# Patient Record
Sex: FEMALE | Race: OTHER | NOT HISPANIC OR LATINO | URBAN - METROPOLITAN AREA
[De-identification: names, ages, dates, MRNs, and addresses within clinical notes are randomized per-mention and may not be internally consistent; named-entity substitution may affect disease eponyms.]

---

## 2017-10-03 ENCOUNTER — OUTPATIENT (OUTPATIENT)
Dept: OUTPATIENT SERVICES | Facility: HOSPITAL | Age: 62
LOS: 1 days | End: 2017-10-03
Payer: COMMERCIAL

## 2017-10-03 VITALS
SYSTOLIC BLOOD PRESSURE: 146 MMHG | RESPIRATION RATE: 16 BRPM | HEIGHT: 66 IN | WEIGHT: 199.08 LBS | TEMPERATURE: 98 F | HEART RATE: 63 BPM | DIASTOLIC BLOOD PRESSURE: 78 MMHG

## 2017-10-03 DIAGNOSIS — M17.12 UNILATERAL PRIMARY OSTEOARTHRITIS, LEFT KNEE: ICD-10-CM

## 2017-10-03 DIAGNOSIS — Z01.818 ENCOUNTER FOR OTHER PREPROCEDURAL EXAMINATION: ICD-10-CM

## 2017-10-03 DIAGNOSIS — Z90.710 ACQUIRED ABSENCE OF BOTH CERVIX AND UTERUS: Chronic | ICD-10-CM

## 2017-10-03 LAB
ALBUMIN SERPL ELPH-MCNC: 4.2 G/DL — SIGNIFICANT CHANGE UP (ref 3.3–5)
ALP SERPL-CCNC: 65 U/L — SIGNIFICANT CHANGE UP (ref 40–120)
ALT FLD-CCNC: 26 U/L — SIGNIFICANT CHANGE UP (ref 12–78)
ANION GAP SERPL CALC-SCNC: 7 MMOL/L — SIGNIFICANT CHANGE UP (ref 5–17)
APTT BLD: 31.8 SEC — SIGNIFICANT CHANGE UP (ref 27.5–37.4)
AST SERPL-CCNC: 17 U/L — SIGNIFICANT CHANGE UP (ref 15–37)
BILIRUB SERPL-MCNC: 0.5 MG/DL — SIGNIFICANT CHANGE UP (ref 0.2–1.2)
BUN SERPL-MCNC: 20 MG/DL — SIGNIFICANT CHANGE UP (ref 7–23)
CALCIUM SERPL-MCNC: 9.1 MG/DL — SIGNIFICANT CHANGE UP (ref 8.5–10.1)
CHLORIDE SERPL-SCNC: 104 MMOL/L — SIGNIFICANT CHANGE UP (ref 96–108)
CO2 SERPL-SCNC: 29 MMOL/L — SIGNIFICANT CHANGE UP (ref 22–31)
CREAT SERPL-MCNC: 0.8 MG/DL — SIGNIFICANT CHANGE UP (ref 0.5–1.3)
GLUCOSE SERPL-MCNC: 94 MG/DL — SIGNIFICANT CHANGE UP (ref 70–99)
HCT VFR BLD CALC: 40.8 % — SIGNIFICANT CHANGE UP (ref 34.5–45)
HGB BLD-MCNC: 13.6 G/DL — SIGNIFICANT CHANGE UP (ref 11.5–15.5)
INR BLD: 1.05 RATIO — SIGNIFICANT CHANGE UP (ref 0.88–1.16)
MCHC RBC-ENTMCNC: 28.3 PG — SIGNIFICANT CHANGE UP (ref 27–34)
MCHC RBC-ENTMCNC: 33.4 GM/DL — SIGNIFICANT CHANGE UP (ref 32–36)
MCV RBC AUTO: 84.8 FL — SIGNIFICANT CHANGE UP (ref 80–100)
PLATELET # BLD AUTO: 266 K/UL — SIGNIFICANT CHANGE UP (ref 150–400)
POTASSIUM SERPL-MCNC: 4.1 MMOL/L — SIGNIFICANT CHANGE UP (ref 3.5–5.3)
POTASSIUM SERPL-SCNC: 4.1 MMOL/L — SIGNIFICANT CHANGE UP (ref 3.5–5.3)
PROT SERPL-MCNC: 7.4 G/DL — SIGNIFICANT CHANGE UP (ref 6–8.3)
PROTHROM AB SERPL-ACNC: 11.5 SEC — SIGNIFICANT CHANGE UP (ref 9.8–12.7)
RBC # BLD: 4.81 M/UL — SIGNIFICANT CHANGE UP (ref 3.8–5.2)
RBC # FLD: 12.8 % — SIGNIFICANT CHANGE UP (ref 10.3–14.5)
SODIUM SERPL-SCNC: 140 MMOL/L — SIGNIFICANT CHANGE UP (ref 135–145)
WBC # BLD: 10.3 K/UL — SIGNIFICANT CHANGE UP (ref 3.8–10.5)
WBC # FLD AUTO: 10.3 K/UL — SIGNIFICANT CHANGE UP (ref 3.8–10.5)

## 2017-10-03 PROCEDURE — 73562 X-RAY EXAM OF KNEE 3: CPT | Mod: 26,LT

## 2017-10-03 PROCEDURE — 93010 ELECTROCARDIOGRAM REPORT: CPT | Mod: NC

## 2017-10-03 PROCEDURE — 71020: CPT | Mod: 26

## 2017-10-03 NOTE — H&P PST ADULT - HISTORY OF PRESENT ILLNESS
63 yo female scheduled for Left Total Knee Replacement on 10/18/17 with Dr Sanders.  Patient has pain in both knees and plans to have both knees replaced Pain worse with movement   Pain for 5 years 63 yo female scheduled for Left Total Knee Replacement on 10/18/17 with Dr Sanders.  Patient plans to have both knees replaced. Patient  states she  has had  pain in both knees for the past 5 years. The knee pain is worse with movement

## 2017-10-03 NOTE — H&P PST ADULT - NSANTHOSAYNRD_GEN_A_CORE
No. RIKA screening performed.  STOP BANG Legend: 0-2 = LOW Risk; 3-4 = INTERMEDIATE Risk; 5-8 = HIGH Risk

## 2017-10-03 NOTE — H&P PST ADULT - PROBLEM SELECTOR PLAN 1
63 yo female scheduled for Left Total Knee Replacement on 10/18/17 with Dr Sanders.   Check labs CBC CMP PT PTT Type and Screen MRSA  EKG  X-ray Chest and Left Knee Xray  Hibiclens Scrubs for 3 days with written and verbal instructions given to patient  Patient to stop Naprosyn and Celebrex 10/11/17  No medications the morning of surgery  Preop instructions were given  Patient verbalizes understanding of instructions   MRSA Instructions written and verbal for positive MRSA given  Patient and her daughter are attending the Joint Replacement Class today  Patient and her daughter verbalize understanding of instructions

## 2017-10-03 NOTE — H&P PST ADULT - FAMILY HISTORY
Mother  Still living? Yes, Estimated age: 87  Hypertension, Age at diagnosis: Age Unknown     Father  Still living? Yes, Estimated age: 90  Family history of cataracts, Age at diagnosis: Age Unknown

## 2017-10-04 LAB
MRSA PCR RESULT.: SIGNIFICANT CHANGE UP
S AUREUS DNA NOSE QL NAA+PROBE: SIGNIFICANT CHANGE UP

## 2017-10-13 RX ORDER — SODIUM CHLORIDE 9 MG/ML
3 INJECTION INTRAMUSCULAR; INTRAVENOUS; SUBCUTANEOUS EVERY 8 HOURS
Qty: 0 | Refills: 0 | Status: DISCONTINUED | OUTPATIENT
Start: 2017-10-16 | End: 2017-10-26

## 2017-10-13 RX ORDER — SODIUM CHLORIDE 9 MG/ML
1000 INJECTION, SOLUTION INTRAVENOUS
Qty: 0 | Refills: 0 | Status: DISCONTINUED | OUTPATIENT
Start: 2017-10-16 | End: 2017-10-16

## 2017-10-15 PROCEDURE — 73562 X-RAY EXAM OF KNEE 3: CPT

## 2017-10-15 PROCEDURE — 87641 MR-STAPH DNA AMP PROBE: CPT

## 2017-10-15 PROCEDURE — 86850 RBC ANTIBODY SCREEN: CPT

## 2017-10-15 PROCEDURE — G0463: CPT

## 2017-10-15 PROCEDURE — 71046 X-RAY EXAM CHEST 2 VIEWS: CPT

## 2017-10-15 PROCEDURE — 86900 BLOOD TYPING SEROLOGIC ABO: CPT

## 2017-10-15 PROCEDURE — 86901 BLOOD TYPING SEROLOGIC RH(D): CPT

## 2017-10-15 PROCEDURE — 85610 PROTHROMBIN TIME: CPT

## 2017-10-15 PROCEDURE — 86920 COMPATIBILITY TEST SPIN: CPT

## 2017-10-15 PROCEDURE — 93005 ELECTROCARDIOGRAM TRACING: CPT

## 2017-10-15 PROCEDURE — 80053 COMPREHEN METABOLIC PANEL: CPT

## 2017-10-15 PROCEDURE — 85027 COMPLETE CBC AUTOMATED: CPT

## 2017-10-15 PROCEDURE — 87640 STAPH A DNA AMP PROBE: CPT

## 2017-10-15 PROCEDURE — 85730 THROMBOPLASTIN TIME PARTIAL: CPT

## 2017-10-16 ENCOUNTER — INPATIENT (INPATIENT)
Facility: HOSPITAL | Age: 62
LOS: 9 days | Discharge: NURSING, REHAB OR LONG-TERM CARE | DRG: 462 | End: 2017-10-26
Attending: ORTHOPAEDIC SURGERY | Admitting: ORTHOPAEDIC SURGERY
Payer: COMMERCIAL

## 2017-10-16 VITALS
HEIGHT: 66 IN | DIASTOLIC BLOOD PRESSURE: 83 MMHG | SYSTOLIC BLOOD PRESSURE: 142 MMHG | WEIGHT: 201.06 LBS | OXYGEN SATURATION: 96 % | TEMPERATURE: 98 F | HEART RATE: 69 BPM | RESPIRATION RATE: 16 BRPM

## 2017-10-16 DIAGNOSIS — M17.12 UNILATERAL PRIMARY OSTEOARTHRITIS, LEFT KNEE: ICD-10-CM

## 2017-10-16 DIAGNOSIS — Z90.710 ACQUIRED ABSENCE OF BOTH CERVIX AND UTERUS: Chronic | ICD-10-CM

## 2017-10-16 LAB
ABO RH CONFIRMATION: SIGNIFICANT CHANGE UP
HCT VFR BLD CALC: 39.5 % — SIGNIFICANT CHANGE UP (ref 34.5–45)
HGB BLD-MCNC: 12.7 G/DL — SIGNIFICANT CHANGE UP (ref 11.5–15.5)

## 2017-10-16 PROCEDURE — 73562 X-RAY EXAM OF KNEE 3: CPT | Mod: 26,LT

## 2017-10-16 PROCEDURE — 88305 TISSUE EXAM BY PATHOLOGIST: CPT | Mod: 26

## 2017-10-16 PROCEDURE — 88311 DECALCIFY TISSUE: CPT | Mod: 26

## 2017-10-16 RX ORDER — FOLIC ACID 0.8 MG
1 TABLET ORAL DAILY
Qty: 0 | Refills: 0 | Status: DISCONTINUED | OUTPATIENT
Start: 2017-10-16 | End: 2017-10-26

## 2017-10-16 RX ORDER — ACETAMINOPHEN 500 MG
1000 TABLET ORAL ONCE
Qty: 0 | Refills: 0 | Status: COMPLETED | OUTPATIENT
Start: 2017-10-17 | End: 2017-10-17

## 2017-10-16 RX ORDER — DOCUSATE SODIUM 100 MG
100 CAPSULE ORAL THREE TIMES A DAY
Qty: 0 | Refills: 0 | Status: DISCONTINUED | OUTPATIENT
Start: 2017-10-16 | End: 2017-10-26

## 2017-10-16 RX ORDER — MORPHINE SULFATE 50 MG/1
2 CAPSULE, EXTENDED RELEASE ORAL EVERY 4 HOURS
Qty: 0 | Refills: 0 | Status: DISCONTINUED | OUTPATIENT
Start: 2017-10-16 | End: 2017-10-23

## 2017-10-16 RX ORDER — SODIUM CHLORIDE 9 MG/ML
1000 INJECTION, SOLUTION INTRAVENOUS
Qty: 0 | Refills: 0 | Status: DISCONTINUED | OUTPATIENT
Start: 2017-10-16 | End: 2017-10-16

## 2017-10-16 RX ORDER — CELECOXIB 200 MG/1
1 CAPSULE ORAL
Qty: 0 | Refills: 0 | COMMUNITY

## 2017-10-16 RX ORDER — BUPIVACAINE 13.3 MG/ML
20 INJECTION, SUSPENSION, LIPOSOMAL INFILTRATION ONCE
Qty: 0 | Refills: 0 | Status: DISCONTINUED | OUTPATIENT
Start: 2017-10-16 | End: 2017-10-16

## 2017-10-16 RX ORDER — HYDROMORPHONE HYDROCHLORIDE 2 MG/ML
2 INJECTION INTRAMUSCULAR; INTRAVENOUS; SUBCUTANEOUS EVERY 4 HOURS
Qty: 0 | Refills: 0 | Status: DISCONTINUED | OUTPATIENT
Start: 2017-10-16 | End: 2017-10-23

## 2017-10-16 RX ORDER — ENOXAPARIN SODIUM 100 MG/ML
40 INJECTION SUBCUTANEOUS DAILY
Qty: 0 | Refills: 0 | Status: COMPLETED | OUTPATIENT
Start: 2017-10-17 | End: 2017-10-22

## 2017-10-16 RX ORDER — OXYCODONE HYDROCHLORIDE 5 MG/1
5 TABLET ORAL EVERY 4 HOURS
Qty: 0 | Refills: 0 | Status: DISCONTINUED | OUTPATIENT
Start: 2017-10-16 | End: 2017-10-16

## 2017-10-16 RX ORDER — OXYCODONE HYDROCHLORIDE 5 MG/1
10 TABLET ORAL EVERY 6 HOURS
Qty: 0 | Refills: 0 | Status: DISCONTINUED | OUTPATIENT
Start: 2017-10-16 | End: 2017-10-16

## 2017-10-16 RX ORDER — PANTOPRAZOLE SODIUM 20 MG/1
40 TABLET, DELAYED RELEASE ORAL DAILY
Qty: 0 | Refills: 0 | Status: DISCONTINUED | OUTPATIENT
Start: 2017-10-16 | End: 2017-10-26

## 2017-10-16 RX ORDER — FERROUS SULFATE 325(65) MG
325 TABLET ORAL
Qty: 0 | Refills: 0 | Status: DISCONTINUED | OUTPATIENT
Start: 2017-10-16 | End: 2017-10-26

## 2017-10-16 RX ORDER — CEFAZOLIN SODIUM 1 G
2000 VIAL (EA) INJECTION EVERY 8 HOURS
Qty: 0 | Refills: 0 | Status: COMPLETED | OUTPATIENT
Start: 2017-10-16 | End: 2017-10-17

## 2017-10-16 RX ORDER — ONDANSETRON 8 MG/1
4 TABLET, FILM COATED ORAL EVERY 6 HOURS
Qty: 0 | Refills: 0 | Status: DISCONTINUED | OUTPATIENT
Start: 2017-10-16 | End: 2017-10-26

## 2017-10-16 RX ORDER — HYDROMORPHONE HYDROCHLORIDE 2 MG/ML
0.5 INJECTION INTRAMUSCULAR; INTRAVENOUS; SUBCUTANEOUS
Qty: 0 | Refills: 0 | Status: DISCONTINUED | OUTPATIENT
Start: 2017-10-16 | End: 2017-10-16

## 2017-10-16 RX ORDER — HYDROMORPHONE HYDROCHLORIDE 2 MG/ML
4 INJECTION INTRAMUSCULAR; INTRAVENOUS; SUBCUTANEOUS EVERY 4 HOURS
Qty: 0 | Refills: 0 | Status: DISCONTINUED | OUTPATIENT
Start: 2017-10-16 | End: 2017-10-23

## 2017-10-16 RX ORDER — ACETAMINOPHEN 500 MG
1000 TABLET ORAL ONCE
Qty: 0 | Refills: 0 | Status: COMPLETED | OUTPATIENT
Start: 2017-10-16 | End: 2017-10-16

## 2017-10-16 RX ORDER — CHOLECALCIFEROL (VITAMIN D3) 125 MCG
0 CAPSULE ORAL
Qty: 0 | Refills: 0 | COMMUNITY

## 2017-10-16 RX ORDER — ASCORBIC ACID 60 MG
500 TABLET,CHEWABLE ORAL
Qty: 0 | Refills: 0 | Status: DISCONTINUED | OUTPATIENT
Start: 2017-10-16 | End: 2017-10-26

## 2017-10-16 RX ORDER — SODIUM CHLORIDE 9 MG/ML
1000 INJECTION, SOLUTION INTRAVENOUS
Qty: 0 | Refills: 0 | Status: DISCONTINUED | OUTPATIENT
Start: 2017-10-16 | End: 2017-10-17

## 2017-10-16 RX ORDER — CELECOXIB 200 MG/1
200 CAPSULE ORAL
Qty: 0 | Refills: 0 | Status: DISCONTINUED | OUTPATIENT
Start: 2017-10-16 | End: 2017-10-26

## 2017-10-16 RX ORDER — PREGABALIN 225 MG/1
0 CAPSULE ORAL
Qty: 0 | Refills: 0 | COMMUNITY

## 2017-10-16 RX ORDER — CEFAZOLIN SODIUM 1 G
2000 VIAL (EA) INJECTION ONCE
Qty: 0 | Refills: 0 | Status: COMPLETED | OUTPATIENT
Start: 2017-10-16 | End: 2017-10-16

## 2017-10-16 RX ORDER — ONDANSETRON 8 MG/1
4 TABLET, FILM COATED ORAL ONCE
Qty: 0 | Refills: 0 | Status: DISCONTINUED | OUTPATIENT
Start: 2017-10-16 | End: 2017-10-16

## 2017-10-16 RX ADMIN — HYDROMORPHONE HYDROCHLORIDE 2 MILLIGRAM(S): 2 INJECTION INTRAMUSCULAR; INTRAVENOUS; SUBCUTANEOUS at 22:17

## 2017-10-16 RX ADMIN — Medication 1000 MILLIGRAM(S): at 18:25

## 2017-10-16 RX ADMIN — Medication 400 MILLIGRAM(S): at 18:20

## 2017-10-16 RX ADMIN — SODIUM CHLORIDE 30 MILLILITER(S): 9 INJECTION, SOLUTION INTRAVENOUS at 09:10

## 2017-10-16 RX ADMIN — Medication 100 MILLIGRAM(S): at 19:10

## 2017-10-16 RX ADMIN — HYDROMORPHONE HYDROCHLORIDE 2 MILLIGRAM(S): 2 INJECTION INTRAMUSCULAR; INTRAVENOUS; SUBCUTANEOUS at 23:00

## 2017-10-16 RX ADMIN — Medication 325 MILLIGRAM(S): at 18:20

## 2017-10-16 RX ADMIN — CELECOXIB 200 MILLIGRAM(S): 200 CAPSULE ORAL at 18:20

## 2017-10-16 RX ADMIN — CELECOXIB 200 MILLIGRAM(S): 200 CAPSULE ORAL at 18:25

## 2017-10-16 RX ADMIN — SODIUM CHLORIDE 100 MILLILITER(S): 9 INJECTION, SOLUTION INTRAVENOUS at 14:30

## 2017-10-16 RX ADMIN — SODIUM CHLORIDE 75 MILLILITER(S): 9 INJECTION, SOLUTION INTRAVENOUS at 22:22

## 2017-10-16 RX ADMIN — Medication 500 MILLIGRAM(S): at 18:20

## 2017-10-16 RX ADMIN — SODIUM CHLORIDE 3 MILLILITER(S): 9 INJECTION INTRAMUSCULAR; INTRAVENOUS; SUBCUTANEOUS at 21:19

## 2017-10-16 RX ADMIN — Medication 100 MILLIGRAM(S): at 22:17

## 2017-10-16 NOTE — CONSULT NOTE ADULT - ASSESSMENT
62 female adm for staged b/l knee replacements  dvt proph- lovenox 40  pain control, ambulation  will follow

## 2017-10-16 NOTE — CONSULT NOTE ADULT - SUBJECTIVE AND OBJECTIVE BOX
Patient is a 62y old  Female who presents with a chief complaint of Left Knee replacement (16 Oct 2017 09:03)  feels well, no complaints      INTERVAL HPI/OVERNIGHT EVENTS:  T(C): 36.4 (10-16-17 @ 16:06), Max: 36.8 (10-16-17 @ 08:58)  HR: 70 (10-16-17 @ 16:06) (60 - 70)  BP: 124/77 (10-16-17 @ 16:06) (124/77 - 142/83)  RR: 16 (10-16-17 @ 16:06) (15 - 19)  SpO2: 98% (10-16-17 @ 16:06) (96% - 100%)  Wt(kg): --  I&O's Summary    16 Oct 2017 07:01  -  16 Oct 2017 19:01  --------------------------------------------------------  IN: 350 mL / OUT: 0 mL / NET: 350 mL        PAST MEDICAL & SURGICAL HISTORY:  Unilateral primary osteoarthritis, left knee  H/O abdominal hysterectomy: 2002      SOCIAL HISTORY  Alcohol: none  Tobacco: none  Illicit substance use: none      FAMILY HISTORY: non contrib    Home Medications:  CeleBREX 200 mg oral capsule: 1 cap(s) orally 2 times a day (16 Oct 2017 08:58)  Vitamin B12:  (16 Oct 2017 08:58)  Vitamin D3: orally once a day (16 Oct 2017 08:58)        LABS:                        12.7   x     )-----------( x        ( 16 Oct 2017 14:57 )             39.5                 MEDICATIONS  (STANDING):  ascorbic acid 500 milliGRAM(s) Oral two times a day  ceFAZolin   IVPB 2000 milliGRAM(s) IV Intermittent every 8 hours  celecoxib 200 milliGRAM(s) Oral two times a day after meals  docusate sodium 100 milliGRAM(s) Oral three times a day  ferrous    sulfate 325 milliGRAM(s) Oral three times a day with meals  folic acid 1 milliGRAM(s) Oral daily  lactated ringers. 1000 milliLiter(s) (75 mL/Hr) IV Continuous <Continuous>  multivitamin 1 Tablet(s) Oral daily  pantoprazole    Tablet 40 milliGRAM(s) Oral daily  sodium chloride 0.9% lock flush 3 milliLiter(s) IV Push every 8 hours    MEDICATIONS  (PRN):  HYDROmorphone   Tablet 2 milliGRAM(s) Oral every 4 hours PRN Moderate Pain (4 - 6)  HYDROmorphone   Tablet 4 milliGRAM(s) Oral every 4 hours PRN Severe Pain (7 - 10)  morphine  - Injectable 2 milliGRAM(s) IV Push every 4 hours PRN Severe Pain (7 - 10)  ondansetron Injectable 4 milliGRAM(s) IV Push every 6 hours PRN Nausea and/or Vomiting      REVIEW OF SYSTEMS:  CONSTITUTIONAL: No fever, weight loss, or fatigue  EYES: No eye pain, visual disturbances, or discharge  ENMT:  No difficulty hearing, tinnitus, vertigo; No sinus or throat pain  NECK: No pain or stiffness  RESPIRATORY: No cough, wheezing, chills or hemoptysis; No shortness of breath  CARDIOVASCULAR: No chest pain, palpitations, dizziness, or leg swelling  GASTROINTESTINAL: No abdominal or epigastric pain. No nausea, vomiting, or hematemesis; No diarrhea or constipation. No melena or hematochezia.  GENITOURINARY: No dysuria, frequency, hematuria, or incontinence  NEUROLOGICAL: No headaches, memory loss, loss of strength, numbness, or tremors  SKIN: No itching, burning, rashes, or lesions   LYMPH NODES: No enlarged glands  ENDOCRINE: No heat or cold intolerance; No hair loss  MUSCULOSKELETAL: pt has chronic knee pain  PSYCHIATRIC: No depression, anxiety, mood swings, or difficulty sleeping  HEME/LYMPH: No easy bruising, or bleeding gums  ALLERY AND IMMUNOLOGIC: No hives or eczema    RADIOLOGY & ADDITIONAL TESTS:    Imaging Personally Reviewed:  [x ] YES  [ ] NO    Consultant(s) Notes Reviewed:  [ x] YES  [ ] NO        PHYSICAL EXAM:  GENERAL: NAD, well-groomed, well-developed  HEAD:  Atraumatic, Normocephalic  EYES: EOMI, PERRLA, conjunctiva and sclera clear  ENMT: No tonsillar erythema, exudates, or enlargement; Moist mucous membranes, Good dentition, No lesions  NECK: Supple, No JVD, Normal thyroid  NERVOUS SYSTEM:  Alert & Oriented X3, Good concentration; Motor Strength 5/5 B/L upper and lower extremities; DTRs 2+ intact and symmetric  CHEST/LUNG: Clear to percussion bilaterally; No rales, rhonchi, wheezing, or rubs  HEART: Regular rate and rhythm; No murmurs, rubs, or gallops  ABDOMEN: Soft, Nontender, Nondistended; Bowel sounds present  EXTREMITIES:  2+ Peripheral Pulses, No clubbing, cyanosis, or edema  LYMPH: No lymphadenopathy noted  SKIN: No rashes or lesions    Care Discussed with Consultants/Other Providers [ ] YES  [ ] NO

## 2017-10-17 LAB
ANION GAP SERPL CALC-SCNC: 9 MMOL/L — SIGNIFICANT CHANGE UP (ref 5–17)
BUN SERPL-MCNC: 15 MG/DL — SIGNIFICANT CHANGE UP (ref 7–23)
CALCIUM SERPL-MCNC: 8.4 MG/DL — LOW (ref 8.5–10.1)
CHLORIDE SERPL-SCNC: 105 MMOL/L — SIGNIFICANT CHANGE UP (ref 96–108)
CO2 SERPL-SCNC: 25 MMOL/L — SIGNIFICANT CHANGE UP (ref 22–31)
CREAT SERPL-MCNC: 0.82 MG/DL — SIGNIFICANT CHANGE UP (ref 0.5–1.3)
GLUCOSE SERPL-MCNC: 117 MG/DL — HIGH (ref 70–99)
HCT VFR BLD CALC: 34.2 % — LOW (ref 34.5–45)
HGB BLD-MCNC: 11.1 G/DL — LOW (ref 11.5–15.5)
POTASSIUM SERPL-MCNC: 4.2 MMOL/L — SIGNIFICANT CHANGE UP (ref 3.5–5.3)
POTASSIUM SERPL-SCNC: 4.2 MMOL/L — SIGNIFICANT CHANGE UP (ref 3.5–5.3)
SODIUM SERPL-SCNC: 139 MMOL/L — SIGNIFICANT CHANGE UP (ref 135–145)

## 2017-10-17 RX ADMIN — Medication 1000 MILLIGRAM(S): at 06:20

## 2017-10-17 RX ADMIN — Medication 100 MILLIGRAM(S): at 22:47

## 2017-10-17 RX ADMIN — Medication 100 MILLIGRAM(S): at 05:53

## 2017-10-17 RX ADMIN — Medication 1 TABLET(S): at 12:20

## 2017-10-17 RX ADMIN — SODIUM CHLORIDE 3 MILLILITER(S): 9 INJECTION INTRAMUSCULAR; INTRAVENOUS; SUBCUTANEOUS at 22:48

## 2017-10-17 RX ADMIN — CELECOXIB 200 MILLIGRAM(S): 200 CAPSULE ORAL at 18:04

## 2017-10-17 RX ADMIN — Medication 325 MILLIGRAM(S): at 08:23

## 2017-10-17 RX ADMIN — Medication 325 MILLIGRAM(S): at 12:20

## 2017-10-17 RX ADMIN — HYDROMORPHONE HYDROCHLORIDE 2 MILLIGRAM(S): 2 INJECTION INTRAMUSCULAR; INTRAVENOUS; SUBCUTANEOUS at 22:55

## 2017-10-17 RX ADMIN — Medication 100 MILLIGRAM(S): at 15:18

## 2017-10-17 RX ADMIN — SODIUM CHLORIDE 3 MILLILITER(S): 9 INJECTION INTRAMUSCULAR; INTRAVENOUS; SUBCUTANEOUS at 15:17

## 2017-10-17 RX ADMIN — Medication 1000 MILLIGRAM(S): at 02:12

## 2017-10-17 RX ADMIN — Medication 500 MILLIGRAM(S): at 05:53

## 2017-10-17 RX ADMIN — SODIUM CHLORIDE 3 MILLILITER(S): 9 INJECTION INTRAMUSCULAR; INTRAVENOUS; SUBCUTANEOUS at 05:45

## 2017-10-17 RX ADMIN — HYDROMORPHONE HYDROCHLORIDE 2 MILLIGRAM(S): 2 INJECTION INTRAMUSCULAR; INTRAVENOUS; SUBCUTANEOUS at 23:55

## 2017-10-17 RX ADMIN — Medication 400 MILLIGRAM(S): at 01:42

## 2017-10-17 RX ADMIN — CELECOXIB 200 MILLIGRAM(S): 200 CAPSULE ORAL at 08:25

## 2017-10-17 RX ADMIN — Medication 325 MILLIGRAM(S): at 18:01

## 2017-10-17 RX ADMIN — PANTOPRAZOLE SODIUM 40 MILLIGRAM(S): 20 TABLET, DELAYED RELEASE ORAL at 12:20

## 2017-10-17 RX ADMIN — ENOXAPARIN SODIUM 40 MILLIGRAM(S): 100 INJECTION SUBCUTANEOUS at 12:19

## 2017-10-17 RX ADMIN — Medication 400 MILLIGRAM(S): at 05:54

## 2017-10-17 RX ADMIN — CELECOXIB 200 MILLIGRAM(S): 200 CAPSULE ORAL at 08:22

## 2017-10-17 RX ADMIN — CELECOXIB 200 MILLIGRAM(S): 200 CAPSULE ORAL at 18:01

## 2017-10-17 RX ADMIN — Medication 500 MILLIGRAM(S): at 18:02

## 2017-10-17 RX ADMIN — Medication 1 MILLIGRAM(S): at 12:19

## 2017-10-18 LAB
HCT VFR BLD CALC: 30.8 % — LOW (ref 34.5–45)
HGB BLD-MCNC: 10 G/DL — LOW (ref 11.5–15.5)
SURGICAL PATHOLOGY FINAL REPORT - CH: SIGNIFICANT CHANGE UP

## 2017-10-18 RX ADMIN — ENOXAPARIN SODIUM 40 MILLIGRAM(S): 100 INJECTION SUBCUTANEOUS at 12:40

## 2017-10-18 RX ADMIN — Medication 500 MILLIGRAM(S): at 06:28

## 2017-10-18 RX ADMIN — Medication 325 MILLIGRAM(S): at 17:01

## 2017-10-18 RX ADMIN — Medication 1 MILLIGRAM(S): at 12:39

## 2017-10-18 RX ADMIN — HYDROMORPHONE HYDROCHLORIDE 2 MILLIGRAM(S): 2 INJECTION INTRAMUSCULAR; INTRAVENOUS; SUBCUTANEOUS at 05:30

## 2017-10-18 RX ADMIN — SODIUM CHLORIDE 3 MILLILITER(S): 9 INJECTION INTRAMUSCULAR; INTRAVENOUS; SUBCUTANEOUS at 06:26

## 2017-10-18 RX ADMIN — Medication 1 TABLET(S): at 12:39

## 2017-10-18 RX ADMIN — SODIUM CHLORIDE 3 MILLILITER(S): 9 INJECTION INTRAMUSCULAR; INTRAVENOUS; SUBCUTANEOUS at 21:00

## 2017-10-18 RX ADMIN — CELECOXIB 200 MILLIGRAM(S): 200 CAPSULE ORAL at 19:15

## 2017-10-18 RX ADMIN — CELECOXIB 200 MILLIGRAM(S): 200 CAPSULE ORAL at 18:36

## 2017-10-18 RX ADMIN — CELECOXIB 200 MILLIGRAM(S): 200 CAPSULE ORAL at 08:25

## 2017-10-18 RX ADMIN — Medication 500 MILLIGRAM(S): at 18:36

## 2017-10-18 RX ADMIN — CELECOXIB 200 MILLIGRAM(S): 200 CAPSULE ORAL at 09:25

## 2017-10-18 RX ADMIN — Medication 100 MILLIGRAM(S): at 06:28

## 2017-10-18 RX ADMIN — Medication 325 MILLIGRAM(S): at 12:40

## 2017-10-18 RX ADMIN — HYDROMORPHONE HYDROCHLORIDE 2 MILLIGRAM(S): 2 INJECTION INTRAMUSCULAR; INTRAVENOUS; SUBCUTANEOUS at 12:39

## 2017-10-18 RX ADMIN — SODIUM CHLORIDE 3 MILLILITER(S): 9 INJECTION INTRAMUSCULAR; INTRAVENOUS; SUBCUTANEOUS at 16:49

## 2017-10-18 RX ADMIN — HYDROMORPHONE HYDROCHLORIDE 2 MILLIGRAM(S): 2 INJECTION INTRAMUSCULAR; INTRAVENOUS; SUBCUTANEOUS at 04:21

## 2017-10-18 RX ADMIN — HYDROMORPHONE HYDROCHLORIDE 2 MILLIGRAM(S): 2 INJECTION INTRAMUSCULAR; INTRAVENOUS; SUBCUTANEOUS at 13:45

## 2017-10-18 RX ADMIN — PANTOPRAZOLE SODIUM 40 MILLIGRAM(S): 20 TABLET, DELAYED RELEASE ORAL at 12:39

## 2017-10-18 RX ADMIN — Medication 325 MILLIGRAM(S): at 08:25

## 2017-10-19 RX ADMIN — SODIUM CHLORIDE 3 MILLILITER(S): 9 INJECTION INTRAMUSCULAR; INTRAVENOUS; SUBCUTANEOUS at 07:18

## 2017-10-19 RX ADMIN — ENOXAPARIN SODIUM 40 MILLIGRAM(S): 100 INJECTION SUBCUTANEOUS at 11:32

## 2017-10-19 RX ADMIN — Medication 1 TABLET(S): at 11:22

## 2017-10-19 RX ADMIN — SODIUM CHLORIDE 3 MILLILITER(S): 9 INJECTION INTRAMUSCULAR; INTRAVENOUS; SUBCUTANEOUS at 15:06

## 2017-10-19 RX ADMIN — HYDROMORPHONE HYDROCHLORIDE 2 MILLIGRAM(S): 2 INJECTION INTRAMUSCULAR; INTRAVENOUS; SUBCUTANEOUS at 07:20

## 2017-10-19 RX ADMIN — HYDROMORPHONE HYDROCHLORIDE 2 MILLIGRAM(S): 2 INJECTION INTRAMUSCULAR; INTRAVENOUS; SUBCUTANEOUS at 21:00

## 2017-10-19 RX ADMIN — SODIUM CHLORIDE 3 MILLILITER(S): 9 INJECTION INTRAMUSCULAR; INTRAVENOUS; SUBCUTANEOUS at 21:10

## 2017-10-19 RX ADMIN — Medication 1 MILLIGRAM(S): at 11:21

## 2017-10-19 RX ADMIN — PANTOPRAZOLE SODIUM 40 MILLIGRAM(S): 20 TABLET, DELAYED RELEASE ORAL at 11:24

## 2017-10-19 RX ADMIN — Medication 100 MILLIGRAM(S): at 21:09

## 2017-10-19 RX ADMIN — Medication 500 MILLIGRAM(S): at 07:22

## 2017-10-19 RX ADMIN — Medication 325 MILLIGRAM(S): at 11:32

## 2017-10-19 RX ADMIN — HYDROMORPHONE HYDROCHLORIDE 2 MILLIGRAM(S): 2 INJECTION INTRAMUSCULAR; INTRAVENOUS; SUBCUTANEOUS at 21:09

## 2017-10-19 RX ADMIN — Medication 325 MILLIGRAM(S): at 08:35

## 2017-10-19 RX ADMIN — Medication 500 MILLIGRAM(S): at 18:07

## 2017-10-19 RX ADMIN — CELECOXIB 200 MILLIGRAM(S): 200 CAPSULE ORAL at 09:30

## 2017-10-19 RX ADMIN — CELECOXIB 200 MILLIGRAM(S): 200 CAPSULE ORAL at 08:35

## 2017-10-19 RX ADMIN — Medication 325 MILLIGRAM(S): at 18:07

## 2017-10-19 RX ADMIN — HYDROMORPHONE HYDROCHLORIDE 2 MILLIGRAM(S): 2 INJECTION INTRAMUSCULAR; INTRAVENOUS; SUBCUTANEOUS at 08:31

## 2017-10-19 RX ADMIN — CELECOXIB 200 MILLIGRAM(S): 200 CAPSULE ORAL at 18:07

## 2017-10-20 RX ORDER — SODIUM CHLORIDE 9 MG/ML
1000 INJECTION INTRAMUSCULAR; INTRAVENOUS; SUBCUTANEOUS
Qty: 0 | Refills: 0 | Status: DISCONTINUED | OUTPATIENT
Start: 2017-10-22 | End: 2017-10-23

## 2017-10-20 RX ORDER — VANCOMYCIN HCL 1 G
1500 VIAL (EA) INTRAVENOUS ONCE
Qty: 0 | Refills: 0 | Status: COMPLETED | OUTPATIENT
Start: 2017-10-23 | End: 2017-10-23

## 2017-10-20 RX ADMIN — CELECOXIB 200 MILLIGRAM(S): 200 CAPSULE ORAL at 17:15

## 2017-10-20 RX ADMIN — Medication 1 TABLET(S): at 11:03

## 2017-10-20 RX ADMIN — HYDROMORPHONE HYDROCHLORIDE 2 MILLIGRAM(S): 2 INJECTION INTRAMUSCULAR; INTRAVENOUS; SUBCUTANEOUS at 21:42

## 2017-10-20 RX ADMIN — Medication 325 MILLIGRAM(S): at 09:16

## 2017-10-20 RX ADMIN — Medication 100 MILLIGRAM(S): at 05:16

## 2017-10-20 RX ADMIN — SODIUM CHLORIDE 3 MILLILITER(S): 9 INJECTION INTRAMUSCULAR; INTRAVENOUS; SUBCUTANEOUS at 21:45

## 2017-10-20 RX ADMIN — Medication 100 MILLIGRAM(S): at 21:39

## 2017-10-20 RX ADMIN — Medication 500 MILLIGRAM(S): at 05:16

## 2017-10-20 RX ADMIN — SODIUM CHLORIDE 3 MILLILITER(S): 9 INJECTION INTRAMUSCULAR; INTRAVENOUS; SUBCUTANEOUS at 13:15

## 2017-10-20 RX ADMIN — Medication 325 MILLIGRAM(S): at 11:04

## 2017-10-20 RX ADMIN — HYDROMORPHONE HYDROCHLORIDE 2 MILLIGRAM(S): 2 INJECTION INTRAMUSCULAR; INTRAVENOUS; SUBCUTANEOUS at 22:42

## 2017-10-20 RX ADMIN — CELECOXIB 200 MILLIGRAM(S): 200 CAPSULE ORAL at 09:45

## 2017-10-20 RX ADMIN — Medication 500 MILLIGRAM(S): at 17:14

## 2017-10-20 RX ADMIN — Medication 1 MILLIGRAM(S): at 11:03

## 2017-10-20 RX ADMIN — ENOXAPARIN SODIUM 40 MILLIGRAM(S): 100 INJECTION SUBCUTANEOUS at 11:03

## 2017-10-20 RX ADMIN — Medication 325 MILLIGRAM(S): at 17:14

## 2017-10-20 RX ADMIN — Medication 100 MILLIGRAM(S): at 13:16

## 2017-10-20 RX ADMIN — SODIUM CHLORIDE 3 MILLILITER(S): 9 INJECTION INTRAMUSCULAR; INTRAVENOUS; SUBCUTANEOUS at 05:16

## 2017-10-20 RX ADMIN — CELECOXIB 200 MILLIGRAM(S): 200 CAPSULE ORAL at 09:16

## 2017-10-20 RX ADMIN — PANTOPRAZOLE SODIUM 40 MILLIGRAM(S): 20 TABLET, DELAYED RELEASE ORAL at 11:03

## 2017-10-20 NOTE — PROGRESS NOTE ADULT - ATTENDING COMMENTS
pt is s/p left knee replacement and is scheduled for replacement of the right knee on monday.  continue lovenox as dvt prophylaxis  pt is stable for procedure on mionday please proceed .   milton

## 2017-10-21 DIAGNOSIS — Z29.9 ENCOUNTER FOR PROPHYLACTIC MEASURES, UNSPECIFIED: ICD-10-CM

## 2017-10-21 DIAGNOSIS — M17.11 UNILATERAL PRIMARY OSTEOARTHRITIS, RIGHT KNEE: ICD-10-CM

## 2017-10-21 RX ADMIN — CELECOXIB 200 MILLIGRAM(S): 200 CAPSULE ORAL at 09:34

## 2017-10-21 RX ADMIN — CELECOXIB 200 MILLIGRAM(S): 200 CAPSULE ORAL at 18:38

## 2017-10-21 RX ADMIN — PANTOPRAZOLE SODIUM 40 MILLIGRAM(S): 20 TABLET, DELAYED RELEASE ORAL at 12:34

## 2017-10-21 RX ADMIN — ENOXAPARIN SODIUM 40 MILLIGRAM(S): 100 INJECTION SUBCUTANEOUS at 12:33

## 2017-10-21 RX ADMIN — Medication 325 MILLIGRAM(S): at 18:38

## 2017-10-21 RX ADMIN — Medication 500 MILLIGRAM(S): at 06:08

## 2017-10-21 RX ADMIN — Medication 500 MILLIGRAM(S): at 18:38

## 2017-10-21 RX ADMIN — Medication 1 TABLET(S): at 12:34

## 2017-10-21 RX ADMIN — Medication 100 MILLIGRAM(S): at 14:39

## 2017-10-21 RX ADMIN — SODIUM CHLORIDE 3 MILLILITER(S): 9 INJECTION INTRAMUSCULAR; INTRAVENOUS; SUBCUTANEOUS at 21:07

## 2017-10-21 RX ADMIN — Medication 100 MILLIGRAM(S): at 21:19

## 2017-10-21 RX ADMIN — CELECOXIB 200 MILLIGRAM(S): 200 CAPSULE ORAL at 19:30

## 2017-10-21 RX ADMIN — SODIUM CHLORIDE 3 MILLILITER(S): 9 INJECTION INTRAMUSCULAR; INTRAVENOUS; SUBCUTANEOUS at 14:34

## 2017-10-21 RX ADMIN — Medication 325 MILLIGRAM(S): at 12:33

## 2017-10-21 RX ADMIN — SODIUM CHLORIDE 3 MILLILITER(S): 9 INJECTION INTRAMUSCULAR; INTRAVENOUS; SUBCUTANEOUS at 06:08

## 2017-10-21 RX ADMIN — Medication 1 MILLIGRAM(S): at 12:33

## 2017-10-21 RX ADMIN — HYDROMORPHONE HYDROCHLORIDE 2 MILLIGRAM(S): 2 INJECTION INTRAMUSCULAR; INTRAVENOUS; SUBCUTANEOUS at 22:00

## 2017-10-21 RX ADMIN — HYDROMORPHONE HYDROCHLORIDE 2 MILLIGRAM(S): 2 INJECTION INTRAMUSCULAR; INTRAVENOUS; SUBCUTANEOUS at 21:19

## 2017-10-21 RX ADMIN — Medication 100 MILLIGRAM(S): at 06:08

## 2017-10-21 RX ADMIN — Medication 325 MILLIGRAM(S): at 09:34

## 2017-10-22 LAB
ANION GAP SERPL CALC-SCNC: 6 MMOL/L — SIGNIFICANT CHANGE UP (ref 5–17)
ANION GAP SERPL CALC-SCNC: 9 MMOL/L — SIGNIFICANT CHANGE UP (ref 5–17)
BUN SERPL-MCNC: 18 MG/DL — SIGNIFICANT CHANGE UP (ref 7–23)
BUN SERPL-MCNC: 20 MG/DL — SIGNIFICANT CHANGE UP (ref 7–23)
CALCIUM SERPL-MCNC: 8.6 MG/DL — SIGNIFICANT CHANGE UP (ref 8.5–10.1)
CALCIUM SERPL-MCNC: 8.8 MG/DL — SIGNIFICANT CHANGE UP (ref 8.5–10.1)
CHLORIDE SERPL-SCNC: 104 MMOL/L — SIGNIFICANT CHANGE UP (ref 96–108)
CHLORIDE SERPL-SCNC: 105 MMOL/L — SIGNIFICANT CHANGE UP (ref 96–108)
CO2 SERPL-SCNC: 29 MMOL/L — SIGNIFICANT CHANGE UP (ref 22–31)
CO2 SERPL-SCNC: 29 MMOL/L — SIGNIFICANT CHANGE UP (ref 22–31)
CREAT SERPL-MCNC: 0.64 MG/DL — SIGNIFICANT CHANGE UP (ref 0.5–1.3)
CREAT SERPL-MCNC: 0.75 MG/DL — SIGNIFICANT CHANGE UP (ref 0.5–1.3)
GLUCOSE SERPL-MCNC: 116 MG/DL — HIGH (ref 70–99)
GLUCOSE SERPL-MCNC: 156 MG/DL — HIGH (ref 70–99)
HCT VFR BLD CALC: 28.4 % — LOW (ref 34.5–45)
HCT VFR BLD CALC: 29.4 % — LOW (ref 34.5–45)
HGB BLD-MCNC: 9 G/DL — LOW (ref 11.5–15.5)
HGB BLD-MCNC: 9.5 G/DL — LOW (ref 11.5–15.5)
INR BLD: 1.07 RATIO — SIGNIFICANT CHANGE UP (ref 0.88–1.16)
MCHC RBC-ENTMCNC: 27.8 PG — SIGNIFICANT CHANGE UP (ref 27–34)
MCHC RBC-ENTMCNC: 27.9 PG — SIGNIFICANT CHANGE UP (ref 27–34)
MCHC RBC-ENTMCNC: 31.8 GM/DL — LOW (ref 32–36)
MCHC RBC-ENTMCNC: 32.3 GM/DL — SIGNIFICANT CHANGE UP (ref 32–36)
MCV RBC AUTO: 86.4 FL — SIGNIFICANT CHANGE UP (ref 80–100)
MCV RBC AUTO: 87.3 FL — SIGNIFICANT CHANGE UP (ref 80–100)
PLATELET # BLD AUTO: 271 K/UL — SIGNIFICANT CHANGE UP (ref 150–400)
PLATELET # BLD AUTO: 306 K/UL — SIGNIFICANT CHANGE UP (ref 150–400)
POTASSIUM SERPL-MCNC: 3.9 MMOL/L — SIGNIFICANT CHANGE UP (ref 3.5–5.3)
POTASSIUM SERPL-MCNC: 3.9 MMOL/L — SIGNIFICANT CHANGE UP (ref 3.5–5.3)
POTASSIUM SERPL-SCNC: 3.9 MMOL/L — SIGNIFICANT CHANGE UP (ref 3.5–5.3)
POTASSIUM SERPL-SCNC: 3.9 MMOL/L — SIGNIFICANT CHANGE UP (ref 3.5–5.3)
PROTHROM AB SERPL-ACNC: 11.7 SEC — SIGNIFICANT CHANGE UP (ref 9.8–12.7)
RBC # BLD: 3.25 M/UL — LOW (ref 3.8–5.2)
RBC # BLD: 3.4 M/UL — LOW (ref 3.8–5.2)
RBC # FLD: 12.7 % — SIGNIFICANT CHANGE UP (ref 10.3–14.5)
RBC # FLD: 12.8 % — SIGNIFICANT CHANGE UP (ref 10.3–14.5)
SODIUM SERPL-SCNC: 140 MMOL/L — SIGNIFICANT CHANGE UP (ref 135–145)
SODIUM SERPL-SCNC: 142 MMOL/L — SIGNIFICANT CHANGE UP (ref 135–145)
WBC # BLD: 10 K/UL — SIGNIFICANT CHANGE UP (ref 3.8–10.5)
WBC # BLD: 8.2 K/UL — SIGNIFICANT CHANGE UP (ref 3.8–10.5)
WBC # FLD AUTO: 10 K/UL — SIGNIFICANT CHANGE UP (ref 3.8–10.5)
WBC # FLD AUTO: 8.2 K/UL — SIGNIFICANT CHANGE UP (ref 3.8–10.5)

## 2017-10-22 PROCEDURE — 73562 X-RAY EXAM OF KNEE 3: CPT | Mod: 26,RT

## 2017-10-22 RX ORDER — ACETAMINOPHEN 500 MG
650 TABLET ORAL EVERY 6 HOURS
Qty: 0 | Refills: 0 | Status: DISCONTINUED | OUTPATIENT
Start: 2017-10-22 | End: 2017-10-26

## 2017-10-22 RX ADMIN — CELECOXIB 200 MILLIGRAM(S): 200 CAPSULE ORAL at 09:34

## 2017-10-22 RX ADMIN — Medication 1 TABLET(S): at 12:30

## 2017-10-22 RX ADMIN — CELECOXIB 200 MILLIGRAM(S): 200 CAPSULE ORAL at 11:00

## 2017-10-22 RX ADMIN — Medication 650 MILLIGRAM(S): at 19:31

## 2017-10-22 RX ADMIN — Medication 100 MILLIGRAM(S): at 14:14

## 2017-10-22 RX ADMIN — SODIUM CHLORIDE 3 MILLILITER(S): 9 INJECTION INTRAMUSCULAR; INTRAVENOUS; SUBCUTANEOUS at 14:13

## 2017-10-22 RX ADMIN — ENOXAPARIN SODIUM 40 MILLIGRAM(S): 100 INJECTION SUBCUTANEOUS at 12:30

## 2017-10-22 RX ADMIN — Medication 325 MILLIGRAM(S): at 12:31

## 2017-10-22 RX ADMIN — Medication 1 MILLIGRAM(S): at 12:30

## 2017-10-22 RX ADMIN — Medication 325 MILLIGRAM(S): at 10:17

## 2017-10-22 RX ADMIN — Medication 325 MILLIGRAM(S): at 19:03

## 2017-10-22 RX ADMIN — CELECOXIB 200 MILLIGRAM(S): 200 CAPSULE ORAL at 10:17

## 2017-10-22 RX ADMIN — Medication 100 MILLIGRAM(S): at 06:11

## 2017-10-22 RX ADMIN — SODIUM CHLORIDE 3 MILLILITER(S): 9 INJECTION INTRAMUSCULAR; INTRAVENOUS; SUBCUTANEOUS at 06:08

## 2017-10-22 RX ADMIN — Medication 500 MILLIGRAM(S): at 19:03

## 2017-10-22 RX ADMIN — Medication 650 MILLIGRAM(S): at 19:03

## 2017-10-22 RX ADMIN — PANTOPRAZOLE SODIUM 40 MILLIGRAM(S): 20 TABLET, DELAYED RELEASE ORAL at 12:31

## 2017-10-22 RX ADMIN — CELECOXIB 200 MILLIGRAM(S): 200 CAPSULE ORAL at 19:31

## 2017-10-22 RX ADMIN — SODIUM CHLORIDE 3 MILLILITER(S): 9 INJECTION INTRAMUSCULAR; INTRAVENOUS; SUBCUTANEOUS at 21:32

## 2017-10-22 RX ADMIN — Medication 100 MILLIGRAM(S): at 21:34

## 2017-10-22 RX ADMIN — CELECOXIB 200 MILLIGRAM(S): 200 CAPSULE ORAL at 19:03

## 2017-10-22 RX ADMIN — Medication 500 MILLIGRAM(S): at 06:11

## 2017-10-22 NOTE — PROGRESS NOTE ADULT - ASSESSMENT
A/P: 62F s/p L TKA POD 6, plan for R TKA 10/23/17  Pain control  Incentive spirometry  WBAT, OOB and ambulate  PT  Plan for OR R TKA Monday 10/23/17  NPO at midnight  IVF while NPO  Hold all chemical DVT ppx at midnight
62 female adm for staged b/l knee replacements  dvt proph- lovenox 40  pain control, ambulation  will follow  no medical contraindications to OR
A/P: 62F s/p L TKA POD 5, plan for R TKA 10/23/17  Pain control  DVT prophylaxis lovenox 40mg sq daily  Incentive spirometry\  WBAT, OOB and ambulate  PT  Plan for OR R TKA Monday 10/23/17  XR R Knee and preop labs Eric 10/22/17
A/P: 62F w/ L knee OA s/p L TKA POD0  Pain control  DVT ppx  WBAT  PT  Ice as needed  DC planning

## 2017-10-23 LAB
APTT BLD: 33.1 SEC — SIGNIFICANT CHANGE UP (ref 27.5–37.4)
HCT VFR BLD CALC: 27.4 % — LOW (ref 34.5–45)
HCT VFR BLD CALC: 29.9 % — LOW (ref 34.5–45)
HGB BLD-MCNC: 8.7 G/DL — LOW (ref 11.5–15.5)
HGB BLD-MCNC: 9.5 G/DL — LOW (ref 11.5–15.5)
INR BLD: 1.08 RATIO — SIGNIFICANT CHANGE UP (ref 0.88–1.16)
MCHC RBC-ENTMCNC: 28 PG — SIGNIFICANT CHANGE UP (ref 27–34)
MCHC RBC-ENTMCNC: 31.8 GM/DL — LOW (ref 32–36)
MCV RBC AUTO: 88.1 FL — SIGNIFICANT CHANGE UP (ref 80–100)
PLATELET # BLD AUTO: 280 K/UL — SIGNIFICANT CHANGE UP (ref 150–400)
PROTHROM AB SERPL-ACNC: 11.8 SEC — SIGNIFICANT CHANGE UP (ref 9.8–12.7)
RBC # BLD: 3.12 M/UL — LOW (ref 3.8–5.2)
RBC # FLD: 13 % — SIGNIFICANT CHANGE UP (ref 10.3–14.5)
WBC # BLD: 8.3 K/UL — SIGNIFICANT CHANGE UP (ref 3.8–10.5)
WBC # FLD AUTO: 8.3 K/UL — SIGNIFICANT CHANGE UP (ref 3.8–10.5)

## 2017-10-23 PROCEDURE — 88305 TISSUE EXAM BY PATHOLOGIST: CPT | Mod: 26

## 2017-10-23 PROCEDURE — 88311 DECALCIFY TISSUE: CPT | Mod: 26

## 2017-10-23 PROCEDURE — 73562 X-RAY EXAM OF KNEE 3: CPT | Mod: 26,RT

## 2017-10-23 RX ORDER — HYDROMORPHONE HYDROCHLORIDE 2 MG/ML
2 INJECTION INTRAMUSCULAR; INTRAVENOUS; SUBCUTANEOUS EVERY 4 HOURS
Qty: 0 | Refills: 0 | Status: DISCONTINUED | OUTPATIENT
Start: 2017-10-23 | End: 2017-10-26

## 2017-10-23 RX ORDER — HYDROMORPHONE HYDROCHLORIDE 2 MG/ML
4 INJECTION INTRAMUSCULAR; INTRAVENOUS; SUBCUTANEOUS EVERY 4 HOURS
Qty: 0 | Refills: 0 | Status: DISCONTINUED | OUTPATIENT
Start: 2017-10-23 | End: 2017-10-26

## 2017-10-23 RX ORDER — VANCOMYCIN HCL 1 G
1500 VIAL (EA) INTRAVENOUS ONCE
Qty: 0 | Refills: 0 | Status: COMPLETED | OUTPATIENT
Start: 2017-10-23 | End: 2017-10-23

## 2017-10-23 RX ORDER — OXYCODONE HYDROCHLORIDE 5 MG/1
5 TABLET ORAL EVERY 4 HOURS
Qty: 0 | Refills: 0 | Status: DISCONTINUED | OUTPATIENT
Start: 2017-10-23 | End: 2017-10-23

## 2017-10-23 RX ORDER — HYDROMORPHONE HYDROCHLORIDE 2 MG/ML
0.5 INJECTION INTRAMUSCULAR; INTRAVENOUS; SUBCUTANEOUS
Qty: 0 | Refills: 0 | Status: DISCONTINUED | OUTPATIENT
Start: 2017-10-23 | End: 2017-10-23

## 2017-10-23 RX ORDER — ACETAMINOPHEN 500 MG
1000 TABLET ORAL ONCE
Qty: 0 | Refills: 0 | Status: COMPLETED | OUTPATIENT
Start: 2017-10-23 | End: 2017-10-23

## 2017-10-23 RX ORDER — ONDANSETRON 8 MG/1
4 TABLET, FILM COATED ORAL ONCE
Qty: 0 | Refills: 0 | Status: DISCONTINUED | OUTPATIENT
Start: 2017-10-23 | End: 2017-10-23

## 2017-10-23 RX ORDER — VANCOMYCIN HCL 1 G
1500 VIAL (EA) INTRAVENOUS EVERY 12 HOURS
Qty: 0 | Refills: 0 | Status: DISCONTINUED | OUTPATIENT
Start: 2017-10-23 | End: 2017-10-23

## 2017-10-23 RX ORDER — ACETAMINOPHEN 500 MG
1000 TABLET ORAL ONCE
Qty: 0 | Refills: 0 | Status: COMPLETED | OUTPATIENT
Start: 2017-10-24 | End: 2017-10-24

## 2017-10-23 RX ORDER — SODIUM CHLORIDE 9 MG/ML
1000 INJECTION INTRAMUSCULAR; INTRAVENOUS; SUBCUTANEOUS
Qty: 0 | Refills: 0 | Status: DISCONTINUED | OUTPATIENT
Start: 2017-10-23 | End: 2017-10-24

## 2017-10-23 RX ORDER — BUPIVACAINE 13.3 MG/ML
20 INJECTION, SUSPENSION, LIPOSOMAL INFILTRATION ONCE
Qty: 0 | Refills: 0 | Status: COMPLETED | OUTPATIENT
Start: 2017-10-23 | End: 2017-10-23

## 2017-10-23 RX ORDER — ASPIRIN/CALCIUM CARB/MAGNESIUM 324 MG
325 TABLET ORAL
Qty: 0 | Refills: 0 | Status: DISCONTINUED | OUTPATIENT
Start: 2017-10-24 | End: 2017-10-26

## 2017-10-23 RX ORDER — MORPHINE SULFATE 50 MG/1
2 CAPSULE, EXTENDED RELEASE ORAL EVERY 4 HOURS
Qty: 0 | Refills: 0 | Status: DISCONTINUED | OUTPATIENT
Start: 2017-10-23 | End: 2017-10-26

## 2017-10-23 RX ORDER — SODIUM CHLORIDE 9 MG/ML
1000 INJECTION, SOLUTION INTRAVENOUS
Qty: 0 | Refills: 0 | Status: DISCONTINUED | OUTPATIENT
Start: 2017-10-23 | End: 2017-10-23

## 2017-10-23 RX ADMIN — SODIUM CHLORIDE 75 MILLILITER(S): 9 INJECTION INTRAMUSCULAR; INTRAVENOUS; SUBCUTANEOUS at 18:20

## 2017-10-23 RX ADMIN — HYDROMORPHONE HYDROCHLORIDE 2 MILLIGRAM(S): 2 INJECTION INTRAMUSCULAR; INTRAVENOUS; SUBCUTANEOUS at 00:24

## 2017-10-23 RX ADMIN — Medication 100 MILLIGRAM(S): at 21:36

## 2017-10-23 RX ADMIN — Medication 325 MILLIGRAM(S): at 18:20

## 2017-10-23 RX ADMIN — Medication 1000 MILLIGRAM(S): at 20:50

## 2017-10-23 RX ADMIN — HYDROMORPHONE HYDROCHLORIDE 2 MILLIGRAM(S): 2 INJECTION INTRAMUSCULAR; INTRAVENOUS; SUBCUTANEOUS at 23:59

## 2017-10-23 RX ADMIN — Medication 400 MILLIGRAM(S): at 20:05

## 2017-10-23 RX ADMIN — Medication 300 MILLIGRAM(S): at 09:45

## 2017-10-23 RX ADMIN — HYDROMORPHONE HYDROCHLORIDE 0.5 MILLIGRAM(S): 2 INJECTION INTRAMUSCULAR; INTRAVENOUS; SUBCUTANEOUS at 14:20

## 2017-10-23 RX ADMIN — CELECOXIB 200 MILLIGRAM(S): 200 CAPSULE ORAL at 18:23

## 2017-10-23 RX ADMIN — SODIUM CHLORIDE 75 MILLILITER(S): 9 INJECTION INTRAMUSCULAR; INTRAVENOUS; SUBCUTANEOUS at 00:26

## 2017-10-23 RX ADMIN — CELECOXIB 200 MILLIGRAM(S): 200 CAPSULE ORAL at 18:20

## 2017-10-23 RX ADMIN — Medication 500 MILLIGRAM(S): at 18:20

## 2017-10-23 RX ADMIN — Medication 300 MILLIGRAM(S): at 21:32

## 2017-10-24 DIAGNOSIS — D50.0 IRON DEFICIENCY ANEMIA SECONDARY TO BLOOD LOSS (CHRONIC): ICD-10-CM

## 2017-10-24 LAB
ANION GAP SERPL CALC-SCNC: 8 MMOL/L — SIGNIFICANT CHANGE UP (ref 5–17)
BUN SERPL-MCNC: 12 MG/DL — SIGNIFICANT CHANGE UP (ref 7–23)
CALCIUM SERPL-MCNC: 8 MG/DL — LOW (ref 8.5–10.1)
CHLORIDE SERPL-SCNC: 108 MMOL/L — SIGNIFICANT CHANGE UP (ref 96–108)
CO2 SERPL-SCNC: 25 MMOL/L — SIGNIFICANT CHANGE UP (ref 22–31)
CREAT SERPL-MCNC: 0.76 MG/DL — SIGNIFICANT CHANGE UP (ref 0.5–1.3)
GLUCOSE SERPL-MCNC: 107 MG/DL — HIGH (ref 70–99)
HCT VFR BLD CALC: 23.3 % — LOW (ref 34.5–45)
HGB BLD-MCNC: 7.5 G/DL — LOW (ref 11.5–15.5)
POTASSIUM SERPL-MCNC: 4.3 MMOL/L — SIGNIFICANT CHANGE UP (ref 3.5–5.3)
POTASSIUM SERPL-SCNC: 4.3 MMOL/L — SIGNIFICANT CHANGE UP (ref 3.5–5.3)
SODIUM SERPL-SCNC: 141 MMOL/L — SIGNIFICANT CHANGE UP (ref 135–145)

## 2017-10-24 RX ADMIN — Medication 1 MILLIGRAM(S): at 12:45

## 2017-10-24 RX ADMIN — CELECOXIB 200 MILLIGRAM(S): 200 CAPSULE ORAL at 08:36

## 2017-10-24 RX ADMIN — CELECOXIB 200 MILLIGRAM(S): 200 CAPSULE ORAL at 17:33

## 2017-10-24 RX ADMIN — SODIUM CHLORIDE 3 MILLILITER(S): 9 INJECTION INTRAMUSCULAR; INTRAVENOUS; SUBCUTANEOUS at 14:08

## 2017-10-24 RX ADMIN — CELECOXIB 200 MILLIGRAM(S): 200 CAPSULE ORAL at 17:31

## 2017-10-24 RX ADMIN — SODIUM CHLORIDE 3 MILLILITER(S): 9 INJECTION INTRAMUSCULAR; INTRAVENOUS; SUBCUTANEOUS at 06:09

## 2017-10-24 RX ADMIN — SODIUM CHLORIDE 3 MILLILITER(S): 9 INJECTION INTRAMUSCULAR; INTRAVENOUS; SUBCUTANEOUS at 21:16

## 2017-10-24 RX ADMIN — HYDROMORPHONE HYDROCHLORIDE 2 MILLIGRAM(S): 2 INJECTION INTRAMUSCULAR; INTRAVENOUS; SUBCUTANEOUS at 19:31

## 2017-10-24 RX ADMIN — Medication 325 MILLIGRAM(S): at 17:31

## 2017-10-24 RX ADMIN — Medication 100 MILLIGRAM(S): at 14:13

## 2017-10-24 RX ADMIN — Medication 100 MILLIGRAM(S): at 06:10

## 2017-10-24 RX ADMIN — HYDROMORPHONE HYDROCHLORIDE 2 MILLIGRAM(S): 2 INJECTION INTRAMUSCULAR; INTRAVENOUS; SUBCUTANEOUS at 20:00

## 2017-10-24 RX ADMIN — CELECOXIB 200 MILLIGRAM(S): 200 CAPSULE ORAL at 09:47

## 2017-10-24 RX ADMIN — Medication 1000 MILLIGRAM(S): at 05:00

## 2017-10-24 RX ADMIN — HYDROMORPHONE HYDROCHLORIDE 2 MILLIGRAM(S): 2 INJECTION INTRAMUSCULAR; INTRAVENOUS; SUBCUTANEOUS at 16:02

## 2017-10-24 RX ADMIN — HYDROMORPHONE HYDROCHLORIDE 2 MILLIGRAM(S): 2 INJECTION INTRAMUSCULAR; INTRAVENOUS; SUBCUTANEOUS at 00:58

## 2017-10-24 RX ADMIN — Medication 325 MILLIGRAM(S): at 06:10

## 2017-10-24 RX ADMIN — Medication 500 MILLIGRAM(S): at 06:10

## 2017-10-24 RX ADMIN — Medication 1 TABLET(S): at 12:45

## 2017-10-24 RX ADMIN — Medication 325 MILLIGRAM(S): at 12:45

## 2017-10-24 RX ADMIN — PANTOPRAZOLE SODIUM 40 MILLIGRAM(S): 20 TABLET, DELAYED RELEASE ORAL at 12:45

## 2017-10-24 RX ADMIN — Medication 500 MILLIGRAM(S): at 17:31

## 2017-10-24 RX ADMIN — Medication 100 MILLIGRAM(S): at 21:17

## 2017-10-24 RX ADMIN — Medication 400 MILLIGRAM(S): at 04:26

## 2017-10-24 RX ADMIN — Medication 325 MILLIGRAM(S): at 08:35

## 2017-10-24 RX ADMIN — HYDROMORPHONE HYDROCHLORIDE 2 MILLIGRAM(S): 2 INJECTION INTRAMUSCULAR; INTRAVENOUS; SUBCUTANEOUS at 14:13

## 2017-10-24 NOTE — DISCHARGE NOTE ADULT - PLAN OF CARE
REHAB, PHYSICAL THERAPY WEIGHT BEARING AS TOLERATED.  FOLLOW UP WITH DR. FARIAS AFTER REHAB CALL [phone] FOR AN APPOINTMENT.  KEEP KNEE AQUACEL  DRESSING  ON DRY AND CLEAN, MAY REMOVE AFTER 11/03/2017

## 2017-10-24 NOTE — DISCHARGE NOTE ADULT - CARE PLAN
Goal:	REHAB, PHYSICAL THERAPY  Instructions for follow-up, activity and diet:	WEIGHT BEARING AS TOLERATED.  FOLLOW UP WITH DR. FARIAS AFTER REHAB CALL [phone] FOR AN APPOINTMENT.  KEEP KNEE AQUACEL  DRESSING  ON DRY AND CLEAN, MAY REMOVE AFTER 11/03/2017 Principal Discharge DX:	Osteoarthritis of both knees, unspecified osteoarthritis type  Goal:	REHAB, PHYSICAL THERAPY  Instructions for follow-up, activity and diet:	WEIGHT BEARING AS TOLERATED.  FOLLOW UP WITH DR. FARIAS AFTER REHAB CALL [phone] FOR AN APPOINTMENT.  KEEP KNEE AQUACEL  DRESSING  ON DRY AND CLEAN, MAY REMOVE AFTER 11/03/2017

## 2017-10-24 NOTE — DISCHARGE NOTE ADULT - CARE PROVIDER_API CALL
Ed Sanders), Orthopaedic Surgery  [address] [place] [address]  Phone: [phone]  Fax: [phone]

## 2017-10-24 NOTE — DISCHARGE NOTE ADULT - MEDICATION SUMMARY - MEDICATIONS TO TAKE
I will START or STAY ON the medications listed below when I get home from the hospital:    acetaminophen 325 mg oral tablet  -- 2 tab(s) by mouth every 6 hours, As needed, Headache  -- Indication: For PAIN    aspirin 325 mg oral tablet  -- 1 tab(s) by mouth 2 times a day  -- Indication: For DVT PROPHYLAXIS FOR 30 DAYS     HYDROmorphone 2 mg oral tablet  -- 1 tab(s) by mouth every 4 hours, As needed, Moderate Pain (4 - 6)  -- Indication: For PAIN    HYDROmorphone 4 mg oral tablet  -- 1 tab(s) by mouth every 4 hours, As needed, Severe Pain (7 - 10)  -- Indication: For PAIN    CeleBREX 200 mg oral capsule  -- 1 cap(s) by mouth 2 times a day  -- Indication: For PAIN    ferrous sulfate 325 mg (65 mg elemental iron) oral tablet  -- 1 tab(s) by mouth 2 times a day  -- Indication: For POST OP ANEMIA     docusate sodium 100 mg oral capsule  -- 1 cap(s) by mouth 3 times a day  -- Indication: For STOOL SOFTNER     pantoprazole 40 mg oral delayed release tablet  -- 1 tab(s) by mouth once a day  -- Indication: For PrEVENT STRESS ULCER    Multiple Vitamins oral tablet  -- 1 tab(s) by mouth once a day  -- Indication: For VITAMIN    ascorbic acid 500 mg oral tablet  -- 1 tab(s) by mouth 2 times a day  -- Indication: For VITAMIN    folic acid 1 mg oral tablet  -- 1 tab(s) by mouth once a day  -- Indication: For VITAMIN    Vitamin B12  -- Indication: For HOME MEDICATION     Vitamin D3  -- orally once a day  -- Indication: For HOME MEDICATION

## 2017-10-24 NOTE — DIETITIAN INITIAL EVALUATION ADULT. - OTHER INFO
Pt POD #1 right TKR, POD #8 left TKR. Pt states that she is eating well, tolerating regular diet. Pt does not eat beef or pork.

## 2017-10-24 NOTE — DISCHARGE NOTE ADULT - PATIENT PORTAL LINK FT
“You can access the Follow[employer] Patient Portal, offered by [facility], by registering with the following website: [email or web address]”

## 2017-10-24 NOTE — DISCHARGE NOTE ADULT - HOSPITAL COURSE
THIS IS A CASE OF A 61 YO FEMALE EVALUATED IN THE OFFICE DUE TO BILATERAL  KNEE PAIN.    PAST MEDICAL HISTORY: DENIED     HOSPITAL COURSE: AFTER THE RISK AND BENEFITS OF SURGICAL INTERVENTION IN DETAILS WERE DISCUSSED WITH THE PATIENT, A CONSENT WAS OBTAINED. AFTER OBTAINING MEDICAL CLEARANCE AND PREOPERATIVE EVALUATION, THE PATIENT WAS TAKEN TO THE OPERATING ROOM ON 10/16/2017 AND THE PATIENT UNDERWENT A  LEFT TOTAL KNEE REPLACEMENT. POSTOPERATIVE PHASE, THE PATIENT WAS ANTICOAGULATED WITH LOVENOX IN ANTICIPATION OF A RIGHT TKR LATER ON THIS ADMISSION. SHE WAS GIVEN 24 HOURS OF IV ANTIBIOTICS. A SOCIAL SERVICE CONSULT WAS OBTAINED FOR DISCHARGE PLANNING. A PHYSICAL THERAPY CONSULT WAS OBTAINED FOR  WEIGHT BEARING AS TOLERATED.   DUE TO ANEMIA OF ACUTE BLOOD LOSS POST OP  IRON SUPPLEMENT GIVEN.     ON 10/23/2017  AFTER THE RISK AND BENEFITS OF SURGICAL INTERVENTION IN DETAILS WERE DISCUSSED WITH THE PATIENT, A CONSENT WAS OBTAINED. AFTER OBTAINING MEDICAL FOLLOW UP AND PREOPERATIVE EVALUATION, THE PATIENT WAS TAKEN TO THE OPERATING ROOM ON 10/23/2017 AND THE PATIENT UNDERWENT A RIGHT TOTAL KNEE REPLACEMENT. POSTOPERATIVE PHASE, THE PATIENT WAS ANTICOAGULATED WITH ASPIRIN AND WAS GIVEN  24 HOURS OF IV ANTIBIOTICS. A SOCIAL SERVICE CONSULT WAS OBTAINED FOR DISCHARGE PLANNING. A PHYSICAL THERAPY CONSULT WAS OBTAINED FOR  WEIGHT BEARING AS TOLERATED.   DUE TO ANEMIA OF ACUTE BLOOD LOSS POST OP TWO UNITS PRBC AND  IRON SUPPLEMENT GIVEN.     DISPOSITION : REHAB  AND FOLLOW UP WITH DR. FARIAS AS OUTPATIENT.

## 2017-10-25 LAB
HCT VFR BLD CALC: 30.3 % — LOW (ref 34.5–45)
HGB BLD-MCNC: 10.2 G/DL — LOW (ref 11.5–15.5)
SURGICAL PATHOLOGY FINAL REPORT - CH: SIGNIFICANT CHANGE UP

## 2017-10-25 RX ADMIN — HYDROMORPHONE HYDROCHLORIDE 2 MILLIGRAM(S): 2 INJECTION INTRAMUSCULAR; INTRAVENOUS; SUBCUTANEOUS at 14:37

## 2017-10-25 RX ADMIN — Medication 325 MILLIGRAM(S): at 17:44

## 2017-10-25 RX ADMIN — HYDROMORPHONE HYDROCHLORIDE 2 MILLIGRAM(S): 2 INJECTION INTRAMUSCULAR; INTRAVENOUS; SUBCUTANEOUS at 08:22

## 2017-10-25 RX ADMIN — PANTOPRAZOLE SODIUM 40 MILLIGRAM(S): 20 TABLET, DELAYED RELEASE ORAL at 12:12

## 2017-10-25 RX ADMIN — Medication 500 MILLIGRAM(S): at 05:18

## 2017-10-25 RX ADMIN — SODIUM CHLORIDE 3 MILLILITER(S): 9 INJECTION INTRAMUSCULAR; INTRAVENOUS; SUBCUTANEOUS at 21:53

## 2017-10-25 RX ADMIN — CELECOXIB 200 MILLIGRAM(S): 200 CAPSULE ORAL at 17:44

## 2017-10-25 RX ADMIN — HYDROMORPHONE HYDROCHLORIDE 2 MILLIGRAM(S): 2 INJECTION INTRAMUSCULAR; INTRAVENOUS; SUBCUTANEOUS at 04:32

## 2017-10-25 RX ADMIN — HYDROMORPHONE HYDROCHLORIDE 2 MILLIGRAM(S): 2 INJECTION INTRAMUSCULAR; INTRAVENOUS; SUBCUTANEOUS at 05:00

## 2017-10-25 RX ADMIN — Medication 325 MILLIGRAM(S): at 08:22

## 2017-10-25 RX ADMIN — HYDROMORPHONE HYDROCHLORIDE 2 MILLIGRAM(S): 2 INJECTION INTRAMUSCULAR; INTRAVENOUS; SUBCUTANEOUS at 18:33

## 2017-10-25 RX ADMIN — HYDROMORPHONE HYDROCHLORIDE 2 MILLIGRAM(S): 2 INJECTION INTRAMUSCULAR; INTRAVENOUS; SUBCUTANEOUS at 17:44

## 2017-10-25 RX ADMIN — SODIUM CHLORIDE 3 MILLILITER(S): 9 INJECTION INTRAMUSCULAR; INTRAVENOUS; SUBCUTANEOUS at 05:17

## 2017-10-25 RX ADMIN — HYDROMORPHONE HYDROCHLORIDE 2 MILLIGRAM(S): 2 INJECTION INTRAMUSCULAR; INTRAVENOUS; SUBCUTANEOUS at 13:18

## 2017-10-25 RX ADMIN — Medication 1 TABLET(S): at 12:12

## 2017-10-25 RX ADMIN — CELECOXIB 200 MILLIGRAM(S): 200 CAPSULE ORAL at 09:53

## 2017-10-25 RX ADMIN — Medication 100 MILLIGRAM(S): at 05:19

## 2017-10-25 RX ADMIN — Medication 325 MILLIGRAM(S): at 05:18

## 2017-10-25 RX ADMIN — HYDROMORPHONE HYDROCHLORIDE 2 MILLIGRAM(S): 2 INJECTION INTRAMUSCULAR; INTRAVENOUS; SUBCUTANEOUS at 09:53

## 2017-10-25 RX ADMIN — Medication 100 MILLIGRAM(S): at 21:53

## 2017-10-25 RX ADMIN — Medication 500 MILLIGRAM(S): at 17:44

## 2017-10-25 RX ADMIN — Medication 1 MILLIGRAM(S): at 12:12

## 2017-10-25 RX ADMIN — Medication 100 MILLIGRAM(S): at 13:18

## 2017-10-25 RX ADMIN — CELECOXIB 200 MILLIGRAM(S): 200 CAPSULE ORAL at 18:33

## 2017-10-25 RX ADMIN — Medication 325 MILLIGRAM(S): at 12:12

## 2017-10-25 RX ADMIN — CELECOXIB 200 MILLIGRAM(S): 200 CAPSULE ORAL at 08:22

## 2017-10-25 NOTE — PHYSICAL THERAPY INITIAL EVALUATION ADULT - PLANNED THERAPY INTERVENTIONS, PT EVAL
bed mobility training/gait training/balance training/transfer training
bed mobility training/gait training/transfer training/balance training

## 2017-10-25 NOTE — BRIEF OPERATIVE NOTE - PROCEDURE
<<-----Click on this checkbox to enter Procedure TKR (total knee replacement)  10/25/2017  LEFT  Active  NICK

## 2017-10-25 NOTE — BRIEF OPERATIVE NOTE - POST-OP DX
Osteoarthritis of knee, unspecified laterality, unspecified osteoarthritis type  10/25/2017    Active  Carmela Cool  Osteoarthritis of left knee, unspecified osteoarthritis type  10/25/2017    Active  Carmela Cool
Osteoarthritis of left knee, unspecified osteoarthritis type  10/25/2017    Active  Carmela Cool

## 2017-10-25 NOTE — PHYSICAL THERAPY INITIAL EVALUATION ADULT - BALANCE TRAINING, PT EVAL
Pt will display fair static and dynamic standing balance in 3-5 sessions
Pt will display fair static and dynamic standing balance in 3-5 sessions

## 2017-10-25 NOTE — PHYSICAL THERAPY INITIAL EVALUATION ADULT - CRITERIA FOR SKILLED THERAPEUTIC INTERVENTIONS
impairments found/risk reduction/prevention/anticipated discharge recommendation/functional limitations in following categories
functional limitations in following categories/anticipated discharge recommendation/risk reduction/prevention/rehab potential/impairments found

## 2017-10-25 NOTE — PHYSICAL THERAPY INITIAL EVALUATION ADULT - MANUAL MUSCLE TESTING RESULTS, REHAB EVAL
Strength is grossly 3+/5 or better for BUE and BLE.
Strength is grossly 3+/5 or better for BUE and BLE.

## 2017-10-25 NOTE — PHYSICAL THERAPY INITIAL EVALUATION ADULT - TRANSFER TRAINING, PT EVAL
Pt will be CGAx1 level of assistance for sit to stand/ stand to sit transfers with appropriate AD in 3-5 sessions
Pt will be CGAx1 level of assistance for sit to stand/ stand to sit transfers with appropriate AD in 3-5 sessions

## 2017-10-25 NOTE — PHYSICAL THERAPY INITIAL EVALUATION ADULT - GAIT TRAINING, PT EVAL
Pt will be CGAx1 using RW for ambulation 45ft in 3-5 sessions
Pt will be CGAx1 for ambulation using appropriate AD for 45 feet in 3-5 sessions

## 2017-10-25 NOTE — PHYSICAL THERAPY INITIAL EVALUATION ADULT - GAIT DEVIATIONS NOTED, PT EVAL
decreased stride length/decreased step length/decreased weight-shifting ability
decreased weight-shifting ability/decreased step length/decreased stride length

## 2017-10-25 NOTE — BRIEF OPERATIVE NOTE - ASSISTANT(S)
JAYANT MCCLOUD, ALEXANDREA MCCLOUD, ASHLEY MORTON DO
JAYANT MCCLOUD, ALEXANDREA MCCLOUD, ASHLEY MORTON DO

## 2017-10-25 NOTE — PHYSICAL THERAPY INITIAL EVALUATION ADULT - BED MOBILITY TRAINING, PT EVAL
Pt will be CGAx1 level of assistance for all bed mobility tasks in 3-5 sessions
Pt will be CGAx1 level of assistance for all bed mobility tasks in 3-5 sessions

## 2017-10-25 NOTE — BRIEF OPERATIVE NOTE - PROCEDURE
<<-----Click on this checkbox to enter Procedure TKR (total knee replacement)  10/25/2017  RIGHT  Active  MYRONOLLA

## 2017-10-25 NOTE — BRIEF OPERATIVE NOTE - PRE-OP DX
Osteoarthritis of left knee, unspecified osteoarthritis type  10/25/2017    Active  Carmela Cool  Osteoarthritis of right knee, unspecified osteoarthritis type  10/25/2017    Active  Carmela Cool
Osteoarthritis of left knee, unspecified osteoarthritis type  10/25/2017    Active  Carmela Cool

## 2017-10-25 NOTE — PHYSICAL THERAPY INITIAL EVALUATION ADULT - ADDITIONAL COMMENTS
Pt reports that she lives alone in a private home with 10 steps to enter. Pt was previously independent with ADLs and ambulation with no assistive device.
Pt reports that she lives alone in a private home with 10 steps to enter. Pt was previously independent with ADLs and ambulation with no assistive device.

## 2017-10-25 NOTE — PHYSICAL THERAPY INITIAL EVALUATION ADULT - PERTINENT HX OF CURRENT PROBLEM, REHAB EVAL
Pt is a 63 y/o F with hx of B knee OA, s/p L TKR on 10/16, currently s/p R TKR.
Pt is a 61 y/o F with hx of L knee OA. Currently s/p L TKR.

## 2017-10-25 NOTE — PHYSICAL THERAPY INITIAL EVALUATION ADULT - GENERAL OBSERVATIONS, REHAB EVAL
Pt received seated in bedside recliner, no apparent distress, call bell in reach.
Pt received semi-bradford in bed, no apparent distress, call bell in reach, family present.

## 2017-10-25 NOTE — PHYSICAL THERAPY INITIAL EVALUATION ADULT - RANGE OF MOTION EXAMINATION, REHAB EVAL
bilateral upper extremity ROM was WFL (within functional limits)/LLE ROM not tested secondary to post surgical precautions/deficits as listed below/Right LE ROM was WFL (within functional limits)
bilateral upper extremity ROM was WFL (within functional limits)/deficits as listed below/RLE ROM not tested secondary to post surgical precautions/Left LE ROM was WFL (within functional limits)

## 2017-10-26 VITALS
DIASTOLIC BLOOD PRESSURE: 72 MMHG | SYSTOLIC BLOOD PRESSURE: 111 MMHG | TEMPERATURE: 98 F | OXYGEN SATURATION: 99 % | HEART RATE: 83 BPM | RESPIRATION RATE: 18 BRPM

## 2017-10-26 PROCEDURE — 88311 DECALCIFY TISSUE: CPT

## 2017-10-26 PROCEDURE — 97530 THERAPEUTIC ACTIVITIES: CPT

## 2017-10-26 PROCEDURE — 85018 HEMOGLOBIN: CPT

## 2017-10-26 PROCEDURE — 86920 COMPATIBILITY TEST SPIN: CPT

## 2017-10-26 PROCEDURE — 97161 PT EVAL LOW COMPLEX 20 MIN: CPT

## 2017-10-26 PROCEDURE — 85027 COMPLETE CBC AUTOMATED: CPT

## 2017-10-26 PROCEDURE — P9016: CPT

## 2017-10-26 PROCEDURE — 86850 RBC ANTIBODY SCREEN: CPT

## 2017-10-26 PROCEDURE — 86901 BLOOD TYPING SEROLOGIC RH(D): CPT

## 2017-10-26 PROCEDURE — 80048 BASIC METABOLIC PNL TOTAL CA: CPT

## 2017-10-26 PROCEDURE — 97116 GAIT TRAINING THERAPY: CPT

## 2017-10-26 PROCEDURE — 85610 PROTHROMBIN TIME: CPT

## 2017-10-26 PROCEDURE — 88305 TISSUE EXAM BY PATHOLOGIST: CPT

## 2017-10-26 PROCEDURE — 97110 THERAPEUTIC EXERCISES: CPT

## 2017-10-26 PROCEDURE — 36415 COLL VENOUS BLD VENIPUNCTURE: CPT

## 2017-10-26 PROCEDURE — C1776: CPT

## 2017-10-26 PROCEDURE — 85730 THROMBOPLASTIN TIME PARTIAL: CPT

## 2017-10-26 PROCEDURE — C1713: CPT

## 2017-10-26 PROCEDURE — 36430 TRANSFUSION BLD/BLD COMPNT: CPT

## 2017-10-26 PROCEDURE — 86900 BLOOD TYPING SEROLOGIC ABO: CPT

## 2017-10-26 PROCEDURE — 73562 X-RAY EXAM OF KNEE 3: CPT

## 2017-10-26 RX ORDER — DOCUSATE SODIUM 100 MG
1 CAPSULE ORAL
Qty: 0 | Refills: 0 | COMMUNITY
Start: 2017-10-26

## 2017-10-26 RX ORDER — ASCORBIC ACID 60 MG
1 TABLET,CHEWABLE ORAL
Qty: 0 | Refills: 0 | COMMUNITY
Start: 2017-10-26

## 2017-10-26 RX ORDER — ACETAMINOPHEN 500 MG
2 TABLET ORAL
Qty: 0 | Refills: 0 | COMMUNITY
Start: 2017-10-26

## 2017-10-26 RX ORDER — HYDROMORPHONE HYDROCHLORIDE 2 MG/ML
1 INJECTION INTRAMUSCULAR; INTRAVENOUS; SUBCUTANEOUS
Qty: 0 | Refills: 0 | COMMUNITY
Start: 2017-10-26

## 2017-10-26 RX ORDER — ASPIRIN/CALCIUM CARB/MAGNESIUM 324 MG
1 TABLET ORAL
Qty: 0 | Refills: 0 | COMMUNITY
Start: 2017-10-26

## 2017-10-26 RX ORDER — PANTOPRAZOLE SODIUM 20 MG/1
1 TABLET, DELAYED RELEASE ORAL
Qty: 0 | Refills: 0 | COMMUNITY
Start: 2017-10-26

## 2017-10-26 RX ORDER — FOLIC ACID 0.8 MG
1 TABLET ORAL
Qty: 0 | Refills: 0 | COMMUNITY
Start: 2017-10-26

## 2017-10-26 RX ORDER — FERROUS SULFATE 325(65) MG
1 TABLET ORAL
Qty: 0 | Refills: 0 | COMMUNITY
Start: 2017-10-26

## 2017-10-26 RX ADMIN — Medication 325 MILLIGRAM(S): at 05:38

## 2017-10-26 RX ADMIN — Medication 325 MILLIGRAM(S): at 08:31

## 2017-10-26 RX ADMIN — Medication 100 MILLIGRAM(S): at 05:38

## 2017-10-26 RX ADMIN — HYDROMORPHONE HYDROCHLORIDE 2 MILLIGRAM(S): 2 INJECTION INTRAMUSCULAR; INTRAVENOUS; SUBCUTANEOUS at 12:30

## 2017-10-26 RX ADMIN — HYDROMORPHONE HYDROCHLORIDE 2 MILLIGRAM(S): 2 INJECTION INTRAMUSCULAR; INTRAVENOUS; SUBCUTANEOUS at 11:31

## 2017-10-26 RX ADMIN — Medication 500 MILLIGRAM(S): at 05:38

## 2017-10-26 RX ADMIN — CELECOXIB 200 MILLIGRAM(S): 200 CAPSULE ORAL at 08:31

## 2017-10-26 RX ADMIN — HYDROMORPHONE HYDROCHLORIDE 2 MILLIGRAM(S): 2 INJECTION INTRAMUSCULAR; INTRAVENOUS; SUBCUTANEOUS at 05:38

## 2017-10-26 NOTE — PROGRESS NOTE ADULT - PROVIDER SPECIALTY LIST ADULT
Anesthesia
Anesthesia
Hospitalist
Hospitalist
Internal Medicine
Orthopedics
Internal Medicine
Orthopedics
Orthopedics
Internal Medicine

## 2017-10-26 NOTE — PROGRESS NOTE ADULT - PROBLEM SELECTOR PLAN 3
GI/dvt prophylaxis
GI/dvt prophylaxis
Post-op  S/P 2 unit PRBC  post transfusion h/h improved
Post-op  S/P 2 unit PRBC  post transfusion h/h improved
Post-op  Transfuse 2 unit PRBC  Check post transfusion h/h  Transfuse prn
GI/dvt prophylaxis

## 2017-10-26 NOTE — PROGRESS NOTE ADULT - PROBLEM SELECTOR PROBLEM 1
Unilateral primary osteoarthritis, left knee
Unilateral primary osteoarthritis, left knee
Unilateral primary osteoarthritis, right knee

## 2017-10-26 NOTE — PROGRESS NOTE ADULT - PROBLEM SELECTOR PROBLEM 2
Unilateral primary osteoarthritis, left knee
Unilateral primary osteoarthritis, right knee
Unilateral primary osteoarthritis, right knee
Unilateral primary osteoarthritis, left knee

## 2017-10-26 NOTE — PROGRESS NOTE ADULT - PROBLEM SELECTOR PLAN 1
Continue post-op care  PT  ortho f/u
Continue post-op care  PT  ortho f/u
S/P R TKR  Continue post-op care  PT  Ortho f/u
S/P R TKR  Continue post-op care  PT  Ortho f/u  DVT prophylaxis with ASA 325mg bid  D/C planning to [facility] in AM
S/P R TKR  Continue post-op care  PT  Ortho f/u  DVT prophylaxis with ASA 325mg bid  D/C planning to [facility] in AM
May proceed to OR for R TKR

## 2017-10-26 NOTE — PROGRESS NOTE ADULT - SUBJECTIVE AND OBJECTIVE BOX
COLLETTE JACOBSON 62y Female  MRN-[id number]     ORTHOPEDIC SURGERY / DR. FARIAS    POD # 1    Vital Signs Last 24 Hrs  T(C): 36.9 (17 Oct 2017 04:52), Max: 36.9 (17 Oct 2017 04:52)  T(F): 98.4 (17 Oct 2017 04:52), Max: 98.4 (17 Oct 2017 04:52)  HR: 72 (17 Oct 2017 04:52) (60 - 77)  BP: 120/79 (17 Oct 2017 04:52) (114/71 - 142/83)  BP(mean): --  RR: 17 (17 Oct 2017 04:52) (15 - 19)  SpO2: 99% (17 Oct 2017 04:52) (96% - 100%)    LEFT KNEE :    DRESSING DRY AND INTACT  GOOD MOTOR TO LEFT LOWER EXTREMITY  NEURO-VASCULAR STATUS INTACT  NO CALF TENDERNESS    Hemoglobin: 11.1 (10-17 @ 04:37)  Hemoglobin: 12.7 (10-16 @ 14:57)    Hematocrit: 34.2 (10-17 @ 04:37)  Hematocrit: 39.5 (10-16 @ 14:57)    10-17    139  |  105  |  15  ----------------------------<  117<H>  4.2   |  25  |  0.82    Ca    8.4<L>      17 Oct 2017 04:37    ASSESSMENT &  PLAN:  POD # 1  S/P LEFT TOTAL KNEE  REPLACEMENT    WEIGHT  BEARING AS TOLERATED, OOB AND AMBULATE, PHYSICAL THERAPY   DVT PROPHYLAXIS LOVENOX 40 MG SQ DAILY IN ANTICIPATION OF A RIGHT TKR ON MONDAY 10/23/2017  INCENTIVE SPIROMETRY   DISCHARGE PLANNING TO  REHAB NEXT WEEK POST RIGHT TKR     PLAN FOR A RIGHT TKR ON MONDAY 10/23/2017
Patient is a 62y old  Female who presents with a chief complaint of Left Knee replacement (16 Oct 2017 09:03)  feels well. awaiting r tkr this coming monday      INTERVAL HPI/OVERNIGHT EVENTS:  T(C): 36.9 (10-19-17 @ 00:44), Max: 36.9 (10-19-17 @ 00:44)  HR: 84 (10-19-17 @ 00:44) (84 - 88)  BP: 105/62 (10-19-17 @ 00:44) (105/62 - 121/91)  RR: 16 (10-19-17 @ 00:44) (16 - 16)  SpO2: 97% (10-19-17 @ 00:44) (97% - 97%)  Wt(kg): --  I&O's Summary    18 Oct 2017 07:01  -  19 Oct 2017 07:00  --------------------------------------------------------  IN: 460 mL / OUT: 0 mL / NET: 460 mL        LABS:                        10.0   x     )-----------( x        ( 18 Oct 2017 04:41 )             30.8               CAPILLARY BLOOD GLUCOSE                MEDICATIONS  (STANDING):  ascorbic acid 500 milliGRAM(s) Oral two times a day  celecoxib 200 milliGRAM(s) Oral two times a day after meals  docusate sodium 100 milliGRAM(s) Oral three times a day  enoxaparin Injectable 40 milliGRAM(s) SubCutaneous daily  ferrous    sulfate 325 milliGRAM(s) Oral three times a day with meals  folic acid 1 milliGRAM(s) Oral daily  multivitamin 1 Tablet(s) Oral daily  pantoprazole    Tablet 40 milliGRAM(s) Oral daily  sodium chloride 0.9% lock flush 3 milliLiter(s) IV Push every 8 hours    MEDICATIONS  (PRN):  HYDROmorphone   Tablet 2 milliGRAM(s) Oral every 4 hours PRN Moderate Pain (4 - 6)  HYDROmorphone   Tablet 4 milliGRAM(s) Oral every 4 hours PRN Severe Pain (7 - 10)  morphine  - Injectable 2 milliGRAM(s) IV Push every 4 hours PRN Severe Pain (7 - 10)  ondansetron Injectable 4 milliGRAM(s) IV Push every 6 hours PRN Nausea and/or Vomiting      REVIEW OF SYSTEMS:  CONSTITUTIONAL: No fever, weight loss, or fatigue  EYES: No eye pain, visual disturbances, or discharge  ENMT:  No difficulty hearing, tinnitus, vertigo; No sinus or throat pain  NECK: No pain or stiffness  RESPIRATORY: No cough, wheezing, chills or hemoptysis; No shortness of breath  CARDIOVASCULAR: No chest pain, palpitations, dizziness, or leg swelling  GASTROINTESTINAL: No abdominal or epigastric pain. No nausea, vomiting, or hematemesis; No diarrhea or constipation. No melena or hematochezia.  GENITOURINARY: No dysuria, frequency, hematuria, or incontinence  NEUROLOGICAL: No headaches, memory loss, loss of strength, numbness, or tremors  SKIN: No itching, burning, rashes, or lesions   LYMPH NODES: No enlarged glands  ENDOCRINE: No heat or cold intolerance; No hair loss  MUSCULOSKELETAL: chronic knee pain  PSYCHIATRIC: No depression, anxiety, mood swings, or difficulty sleeping  HEME/LYMPH: No easy bruising, or bleeding gums  ALLERY AND IMMUNOLOGIC: No hives or eczema    RADIOLOGY & ADDITIONAL TESTS:    Imaging Personally Reviewed:  [ ] YES  [ ] NO    Consultant(s) Notes Reviewed:  [ ] YES  [ ] NO    PHYSICAL EXAM:  GENERAL: NAD, well-groomed, well-developed  HEAD:  Atraumatic, Normocephalic  EYES: EOMI, PERRLA, conjunctiva and sclera clear  ENMT: No tonsillar erythema, exudates, or enlargement; Moist mucous membranes, Good dentition, No lesions  NECK: Supple, No JVD, Normal thyroid  NERVOUS SYSTEM:  Alert & Oriented X3, Good concentration; Motor Strength 5/5 B/L upper and lower extremities; DTRs 2+ intact and symmetric  CHEST/LUNG: Clear to percussion bilaterally; No rales, rhonchi, wheezing, or rubs  HEART: Regular rate and rhythm; No murmurs, rubs, or gallops  ABDOMEN: Soft, Nontender, Nondistended; Bowel sounds present  EXTREMITIES:  2+ Peripheral Pulses, No clubbing, cyanosis, or edema, no calf tenderness  LYMPH: No lymphadenopathy noted  SKIN: No rashes or lesions    Care Discussed with Consultants/Other Providers [ ] YES  [ ] NO
COLLETTE JACOBSON 62y Female  MRN-[id number]     ORTHOPEDIC SURGERY / DR. FARIAS    POD # 2 S/P RIGHT TKR  POD # 9 S/P LEFT TKR     Vital Signs Last 24 Hrs  T(C): 36.8 (25 Oct 2017 04:21), Max: 37.3 (24 Oct 2017 14:01)  T(F): 98.3 (25 Oct 2017 04:21), Max: 99.1 (24 Oct 2017 14:01)  HR: 88 (25 Oct 2017 04:21) (88 - 97)  BP: 146/88 (25 Oct 2017 04:21) (109/68 - 146/88)  BP(mean): --  RR: 16 (25 Oct 2017 04:21) (16 - 16)  SpO2: 98% (25 Oct 2017 04:21) (97% - 99%)    BILATERAL KNEES :    DRESSING DRY AND INTACT  UNCHANGED BILATERAL EDEMA,   UNCHANGED LEFT MEDIAL KNEE  ECCHYMOSIS   GOOD MOTOR TO BILATERAL  LOWER EXTREMITY  NEURO-VASCULAR STATUS INTACT  NO CALF TENDERNESS    Hemoglobin: 10.2 (10-25 @ 04:38)  Hemoglobin: 7.5 (10-24 @ 03:48)  Hemoglobin: 9.5 (10-23 @ 13:42)  Hemoglobin: 8.7 (10-23 @ 06:55)  Hemoglobin: 9.5 (10-22 @ 20:10)  Hemoglobin: 9.0 (10-22 @ 08:18)    Hematocrit: 30.3 (10-25 @ 04:38)  Hematocrit: 23.3 (10-24 @ 03:48)  Hematocrit: 29.9 (10-23 @ 13:42)  Hematocrit: 27.4 (10-23 @ 06:55)  Hematocrit: 29.4 (10-22 @ 20:10)  Hematocrit: 28.4 (10-22 @ 08:18)    10-24    141  |  108  |  12  ----------------------------<  107<H>  4.3   |  25  |  0.76    Ca    8.0<L>      24 Oct 2017 03:48    ASSESSMENT &  PLAN: S/P STAGED BILATERAL TOTAL KNEE  REPLACEMENT    WEIGHT  BEARING AS TOLERATED, OOB AND AMBULATE, PHYSICAL THERAPY   DVT PROPHYLAXIS  MG PO BID  INCENTIVE SPIROMETRY     ANEMIA POST OP ACUTE BLOOD LOSS S/P 2 UNITS PRBC H/H IMPROVED     DISCHARGE PLANNING TO REHAB
COLLETTE JACOBSON 62y Female  MRN-[id number]     ORTHOPEDIC SURGERY / DR. FARIAS    POD # 2    Vital Signs Last 24 Hrs  T(C): 36.8 (18 Oct 2017 04:00), Max: 37.4 (17 Oct 2017 11:30)  T(F): 98.2 (18 Oct 2017 04:00), Max: 99.4 (17 Oct 2017 11:30)  HR: 93 (18 Oct 2017 04:00) (73 - 93)  BP: 124/81 (18 Oct 2017 04:00) (115/75 - 142/90)  BP(mean): --  RR: 16 (18 Oct 2017 04:00) (16 - 16)  SpO2: 98% (18 Oct 2017 04:00) (97% - 100%)    LEFT KNEE :    DRESSING DRY AND INTACT  SOME EDEMA  GOOD MOTOR TO LEFT LOWER EXTREMITY  NEURO-VASCULAR STATUS INTACT  NO CALF TENDERNESS    Hemoglobin: 10.0 (10-18 @ 04:41)  Hemoglobin: 11.1 (10-17 @ 04:37)  Hemoglobin: 12.7 (10-16 @ 14:57)    Hematocrit: 30.8 (10-18 @ 04:41)  Hematocrit: 34.2 (10-17 @ 04:37)  Hematocrit: 39.5 (10-16 @ 14:57)    10-17    139  |  105  |  15  ----------------------------<  117<H>  4.2   |  25  |  0.82    Ca    8.4<L>      17 Oct 2017 04:37    ASSESSMENT &  PLAN:  POD # 2 S/P LEFT TOTAL KNEE  REPLACEMENT    WEIGHT  BEARING AS TOLERATED, OOB AND AMBULATE, PHYSICAL THERAPY   DVT PROPHYLAXIS LOVENOX 40 MG SQ DAILY  INCENTIVE SPIROMETRY     PLAN FOR A RIGHT TOTAL KNEE REPLACEMENT ON MONDAY 10/23/2017    DISCHARGE PLANNING TO  REHAB NEXT WEEK
COLLETTE JACOBSON 62y Female  MRN-[id number]     ORTHOPEDIC SURGERY / DR. FARIAS    POD # 7    Vital Signs Last 24 Hrs  T(C): 36.7 (22 Oct 2017 23:51), Max: 36.7 (22 Oct 2017 16:30)  T(F): 98.1 (22 Oct 2017 23:51), Max: 98.1 (22 Oct 2017 16:30)  HR: 79 (22 Oct 2017 23:51) (76 - 79)  BP: 129/83 (22 Oct 2017 23:51) (119/70 - 129/83)  BP(mean): --  RR: 16 (22 Oct 2017 23:51) (16 - 16)  SpO2: 99% (22 Oct 2017 23:51) (98% - 99%)    LEFT KNEE :    DRESSING DRY AND INTACT  MEDIAL ECCHYMOSIS NOTED   UNCHANGED  EDEMA  GOOD MOTOR TO LEFT LOWER EXTREMITY  NEURO-VASCULAR STATUS INTACT  NO CALF TENDERNESS    Hemoglobin: 9.5 (10-22 @ 20:10)  Hemoglobin: 9.0 (10-22 @ 08:18)    Hematocrit: 29.4 (10-22 @ 20:10)  Hematocrit: 28.4 (10-22 @ 08:18)    10-22    140  |  105  |  18  ----------------------------<  156<H>  3.9   |  29  |  0.64    Ca    8.6      22 Oct 2017 20:10    Prothrombin Time and INR, Plasma in AM (10.23.17 @ 04:45)    Prothrombin Time, Plasma: 11.8: Effective March 21st, the reference range for PT has changed. sec    INR: 1.08: Please note: New Critical Value: 5.0 ratio as of 1/2/14. ratio    Type + Screen (10.22.17 @ 08:18)    ABO RH Interpretation: A POS              PROCEDURE DATE:  10/22/2017      INTERPRETATION:  Clinical Information: Right knee osteoarthritis    Technique: 2 views of the right knee     Comparison: None    Impression: There is severe patellofemoral and medial compartment   osteoarthritis characterized by bone-on-bone contact, and large bulky   ossified formation. There is mild lateral compartment joint space   narrowing.    ASSESSMENT &  PLAN:  POD # 7  S/P LEFT TOTAL KNEE  REPLACEMENT    WEIGHT  BEARING AS TOLERATED, OOB AND AMBULATE, PHYSICAL THERAPY    TO OR TODAY FOR A  RIGHT TKR
COLLETTE JACOBSON 62y Female  MRN-[id number]     ORTHOPEDIC SURGERY / DR. FARIAS    POD # 3 S/P RIGHT TKR  POD # 10 S/P LEFT TKR     Vital Signs Last 24 Hrs  T(C): 36.6 (25 Oct 2017 23:40), Max: 36.7 (25 Oct 2017 08:32)  T(F): 97.8 (25 Oct 2017 23:40), Max: 98.1 (25 Oct 2017 08:32)  HR: 77 (25 Oct 2017 23:40) (76 - 88)  BP: 116/74 (25 Oct 2017 23:40) (116/74 - 129/75)  BP(mean): --  RR: 17 (25 Oct 2017 23:40) (16 - 17)  SpO2: 96% (25 Oct 2017 23:40) (96% - 97%)    BILATERAL  KNEE :    WOUND DRY AND INTACT  UNCHANGED EDEMA AND LEFT MEDIAL ECCHYMOSIS  GOOD MOTOR TO BILATERAL LOWER EXTREMITY  NEURO-VASCULAR STATUS INTACT  NO CALF TENDERNESS    Hemoglobin: 10.2 (10-25 @ 04:38)  Hemoglobin: 7.5 (10-24 @ 03:48)  Hemoglobin: 9.5 (10-23 @ 13:42)  Hemoglobin: 8.7 (10-23 @ 06:55)    Hematocrit: 30.3 (10-25 @ 04:38)  Hematocrit: 23.3 (10-24 @ 03:48)  Hematocrit: 29.9 (10-23 @ 13:42)  Hematocrit: 27.4 (10-23 @ 06:55)    ASSESSMENT &  PLAN: S/P STAGED BILATERAL  TOTAL KNEE  REPLACEMENT    WEIGHT  BEARING AS TOLERATED, OOB AND AMBULATE, PHYSICAL THERAPY   DVT PROPHYLAXIS  MG PO BID  INCENTIVE SPIROMETRY   DISCHARGE PLANNING TO REHAB TODAY   NEW AQUACEL DRESSING APPLIED
COLLETTE JACOBSON 62y Female  MRN-[id number]     ORTHOPEDIC SURGERY / DR. FARIAS    POD # 1 S/P RIGHT TKR  POD # 8 S/P LEFT TKR      Vital Signs Last 24 Hrs  T(C): 37.1 (24 Oct 2017 04:30), Max: 37.1 (24 Oct 2017 04:30)  T(F): 98.7 (24 Oct 2017 04:30), Max: 98.7 (24 Oct 2017 04:30)  HR: 90 (24 Oct 2017 04:30) (71 - 90)  BP: 139/83 (24 Oct 2017 04:30) (114/69 - 140/80)  BP(mean): --  RR: 16 (24 Oct 2017 04:30) (16 - 25)  SpO2: 99% (24 Oct 2017 04:30) (94% - 100%)    BILATERAL KNEES :    DRESSING DRY AND INTACT  SOME EDEMA BILATERAL, UNCHANGED ECCHYMOSIS LEFT MEDIAL KNEE    GOOD MOTOR BILATERAL  LOWER EXTREMITY  NEURO-VASCULAR STATUS INTACT  NO CALF TENDERNESS    Hemoglobin: 7.5 (10-24 @ 03:48)  Hemoglobin: 9.5 (10-23 @ 13:42)  Hemoglobin: 8.7 (10-23 @ 06:55)  Hemoglobin: 9.5 (10-22 @ 20:10)  Hemoglobin: 9.0 (10-22 @ 08:18)    Hematocrit: 23.3 (10-24 @ 03:48)  Hematocrit: 29.9 (10-23 @ 13:42)  Hematocrit: 27.4 (10-23 @ 06:55)  Hematocrit: 29.4 (10-22 @ 20:10)  Hematocrit: 28.4 (10-22 @ 08:18)    10-24    141  |  108  |  12  ----------------------------<  107<H>  4.3   |  25  |  0.76    Ca    8.0<L>      24 Oct 2017 03:48      ASSESSMENT &  PLAN: S/P STAGED BILATERAL TOTAL KNEE  REPLACEMENT    WEIGHT  BEARING AS TOLERATED, OOB AND AMBULATE, PHYSICAL THERAPY   DVT PROPHYLAXIS  MG PO BID  INCENTIVE SPIROMETRY     ANEMIA POST OP ACUTE BLOOD LOSS  TRANSFUSE 2 UNITS PRBC    DISCHARGE PLANNING TO REHAB    PLAN OF CARE , NEED FOR BLOOD TRANSFUSIONS IN DETAILS DISCUSSED WITH PATIENT
DEPARTMENT OF ANESTHESIA  POST ANESTHETIC EVALUATION    The Patient was interviewed and evaluated    Vital Signs Last 24 Hrs  T(C): 36.6 (17 Oct 2017 07:32), Max: 36.9 (17 Oct 2017 04:52)  T(F): 97.9 (17 Oct 2017 07:32), Max: 98.4 (17 Oct 2017 04:52)  HR: 73 (17 Oct 2017 07:32) (60 - 77)  BP: 115/75 (17 Oct 2017 07:32) (114/71 - 142/72)  BP(mean): --  RR: 16 (17 Oct 2017 07:32) (15 - 19)  SpO2: 99% (17 Oct 2017 07:32) (97% - 100%)    Evaluation:      (x ) No apparent complications or complaints regarding anesthesia care at this time  (x ) All questions were answered    Condition:  (x ) Stable      ( ) Guarded      ( ) Critical    Recommendations:  (x ) None     ( ) Other:
JACOBSONCOLLETTE ESPITIA 62y Female  MRN-[id number]     ORTHOPEDIC SURGERY / DR. FARIAS    POD # 3    Vital Signs Last 24 Hrs  T(C): 36.9 (19 Oct 2017 00:44), Max: 36.9 (18 Oct 2017 07:33)  T(F): 98.4 (19 Oct 2017 00:44), Max: 98.5 (18 Oct 2017 07:33)  HR: 84 (19 Oct 2017 00:44) (79 - 88)  BP: 105/62 (19 Oct 2017 00:44) (103/66 - 121/91)  BP(mean): --  RR: 16 (19 Oct 2017 00:44) (16 - 16)  SpO2: 97% (19 Oct 2017 00:44) (97% - 97%)    LEFT KNEE :    WOUND DRY AND INTACT  UNCHANGED  EDEMA  GOOD MOTOR TO LEFT LOWER EXTREMITY  NEURO-VASCULAR STATUS INTACT  NO CALF TENDERNESS    Hemoglobin: 10.0 (10-18 @ 04:41)  Hemoglobin: 11.1 (10-17 @ 04:37)  Hemoglobin: 12.7 (10-16 @ 14:57)    Hematocrit: 30.8 (10-18 @ 04:41)  Hematocrit: 34.2 (10-17 @ 04:37)  Hematocrit: 39.5 (10-16 @ 14:57)    ASSESSMENT &  PLAN:  POD # 3  S/P LEFT TOTAL KNEE  REPLACEMENT    WEIGHT  BEARING AS TOLERATED, OOB AND AMBULATE, PHYSICAL THERAPY   DVT PROPHYLAXIS LOVENOX 40 MG SQ DAILY  INCENTIVE SPIROMETRY     PLAN TO OR ON MONDAY FOR A RIGHT TOTAL KNEE REPLACEMENT  WILL PREOP      DISCHARGE PLANNING TO REHAB NEXT WEEK POST RIGHT TKR     NEW AQUACEL DRESSING APPLIED
JACOBSONCOLLETTE ESPITIA 62y Female  MRN-[id number]     ORTHOPEDIC SURGERY / DR. FARIAS    POD # 4    Vital Signs Last 24 Hrs  T(C): 36.8 (20 Oct 2017 00:00), Max: 36.8 (19 Oct 2017 16:19)  T(F): 98.3 (20 Oct 2017 00:00), Max: 98.3 (20 Oct 2017 00:00)  HR: 88 (20 Oct 2017 00:00) (85 - 88)  BP: 112/74 (20 Oct 2017 00:00) (107/65 - 112/74)  BP(mean): --  RR: 16 (20 Oct 2017 00:00) (16 - 16)  SpO2: 100% (20 Oct 2017 00:00) (99% - 100%)    LEFT KNEE :    NEW AQUACEL DRESSING DRY AND INTACT  UNCHANGED  EDEMA  GOOD MOTOR TO LEFT LOWER EXTREMITY  NEURO-VASCULAR STATUS INTACT  NO CALF TENDERNESS      Hemoglobin: 10.0 (10-18 @ 04:41)  Hematocrit: 30.8 (10-18 @ 04:41)    ASSESSMENT &  PLAN:  POD # 4 S/P LEFT TOTAL KNEE  REPLACEMENT    WEIGHT  BEARING AS TOLERATED, OOB AND AMBULATE, PHYSICAL THERAPY   DVT PROPHYLAXIS LOVENOX 40 MG SQ DAILY   INCENTIVE SPIROMETRY     PLAN FOR A RIGHT TKR ON MONDAY 10/23/2017   X-RAY RIGHT, PREOP LABS ON SUNDAY     DISCHARGE PLANNING TO HOME  REHAB NEXT WEEK  POST RIGHT TKR
Patient is a 62y old  Female who presents with a chief complaint of BILATERAL KNEE PAIN   BILATERAL STAGED TOTAL KNEE REPLACEMENT (24 Oct 2017 06:08)      INTERVAL HPI/OVERNIGHT EVENTS: Patient seen and examined. NAD. No complaints.    Vital Signs Last 24 Hrs  T(C): 36.5 (24 Oct 2017 07:25), Max: 37.1 (24 Oct 2017 04:30)  T(F): 97.7 (24 Oct 2017 07:25), Max: 98.7 (24 Oct 2017 04:30)  HR: 90 (24 Oct 2017 07:25) (71 - 90)  BP: 109/68 (24 Oct 2017 07:25) (109/68 - 140/80)  BP(mean): --  RR: 16 (24 Oct 2017 07:25) (16 - 25)  SpO2: 98% (24 Oct 2017 07:25) (94% - 100%)    10-24    141  |  108  |  12  ----------------------------<  107<H>  4.3   |  25  |  0.76    Ca    8.0<L>      24 Oct 2017 03:48                            7.5    x     )-----------( x        ( 24 Oct 2017 03:48 )             23.3     PT/INR - ( 23 Oct 2017 04:45 )   PT: 11.8 sec;   INR: 1.08 ratio         PTT - ( 23 Oct 2017 04:45 )  PTT:33.1 sec  CAPILLARY BLOOD GLUCOSE                      acetaminophen   Tablet. 650 milliGRAM(s) Oral every 6 hours PRN  ascorbic acid 500 milliGRAM(s) Oral two times a day  aspirin 325 milliGRAM(s) Oral two times a day  celecoxib 200 milliGRAM(s) Oral two times a day after meals  docusate sodium 100 milliGRAM(s) Oral three times a day  ferrous    sulfate 325 milliGRAM(s) Oral three times a day with meals  folic acid 1 milliGRAM(s) Oral daily  HYDROmorphone   Tablet 2 milliGRAM(s) Oral every 4 hours PRN  HYDROmorphone   Tablet 4 milliGRAM(s) Oral every 4 hours PRN  morphine  - Injectable 2 milliGRAM(s) IV Push every 4 hours PRN  multivitamin 1 Tablet(s) Oral daily  ondansetron Injectable 4 milliGRAM(s) IV Push every 6 hours PRN  pantoprazole    Tablet 40 milliGRAM(s) Oral daily  sodium chloride 0.9% lock flush 3 milliLiter(s) IV Push every 8 hours      REVIEW OF SYSTEMS:  CONSTITUTIONAL: No fever, no weight loss, or no fatigue  NECK: No pain, no stiffness  RESPIRATORY: No cough, no wheezing, no chills, no hemoptysis, No shortness of breath  CARDIOVASCULAR: No chest pain, no palpitations, no dizziness, no leg swelling  GASTROINTESTINAL: No abdominal pain. No nausea, no vomiting, no hematemesis; No diarrhea, no constipation. No melena, no hematochezia.  GENITOURINARY: No dysuria, no frequency, no hematuria, no incontinence  NEUROLOGICAL: No headaches, no loss of strength, no numbness, no tremors  SKIN: No itching, no burning  MUSCULOSKELETAL: No joint pain, no swelling; No muscle, no back, no extremity pain  PSYCHIATRIC: No depression, no mood swings,   HEME/LYMPH: No easy bruising, no bleeding gums  ALLERY AND IMMUNOLOGIC: No hives       Consultant(s) Notes Reviewed:  [x ] YES  [ ] NO    PHYSICAL EXAM:  GENERAL: NAD  HEAD:  Atraumatic, Normocephalic  EYES: EOMI, PERRLA, conjunctiva and sclera clear  ENMT: No tonsillar erythema, exudates, or enlargement; Moist mucous membranes  NECK: Supple, No JVD  NERVOUS SYSTEM:  Awake & alert  CHEST/LUNG: Clear to auscultation bilaterally; No rales, rhonchi, wheezing,  HEART: Regular rate and rhythm  ABDOMEN: Soft, Nontender, Nondistended; Bowel sounds present  EXTREMITIES:  No clubbing, cyanosis, or edema  LYMPH: No lymphadenopathy noted  SKIN: No rashes      Advanced care planning discussed with patient/family [x ] YES   [ ] NO
Patient is a 62y old  Female who presents with a chief complaint of BILATERAL KNEE PAIN   BILATERAL STAGED TOTAL KNEE REPLACEMENT (24 Oct 2017 06:08)      INTERVAL HPI/OVERNIGHT EVENTS: Patient seen and examined. NAD. No complaints.    Vital Signs Last 24 Hrs  T(C): 36.5 (25 Oct 2017 15:46), Max: 37.2 (24 Oct 2017 20:10)  T(F): 97.7 (25 Oct 2017 15:46), Max: 98.9 (24 Oct 2017 20:10)  HR: 88 (25 Oct 2017 15:46) (76 - 93)  BP: 129/75 (25 Oct 2017 15:46) (123/79 - 146/88)  BP(mean): --  RR: 16 (25 Oct 2017 15:46) (16 - 16)  SpO2: 97% (25 Oct 2017 15:46) (97% - 99%)    10-24    141  |  108  |  12  ----------------------------<  107<H>  4.3   |  25  |  0.76    Ca    8.0<L>      24 Oct 2017 03:48                            10.2   x     )-----------( x        ( 25 Oct 2017 04:38 )             30.3       CAPILLARY BLOOD GLUCOSE                                  acetaminophen   Tablet. 650 milliGRAM(s) Oral every 6 hours PRN  ascorbic acid 500 milliGRAM(s) Oral two times a day  aspirin 325 milliGRAM(s) Oral two times a day  celecoxib 200 milliGRAM(s) Oral two times a day after meals  docusate sodium 100 milliGRAM(s) Oral three times a day  ferrous    sulfate 325 milliGRAM(s) Oral three times a day with meals  folic acid 1 milliGRAM(s) Oral daily  HYDROmorphone   Tablet 2 milliGRAM(s) Oral every 4 hours PRN  HYDROmorphone   Tablet 4 milliGRAM(s) Oral every 4 hours PRN  morphine  - Injectable 2 milliGRAM(s) IV Push every 4 hours PRN  multivitamin 1 Tablet(s) Oral daily  ondansetron Injectable 4 milliGRAM(s) IV Push every 6 hours PRN  pantoprazole    Tablet 40 milliGRAM(s) Oral daily  sodium chloride 0.9% lock flush 3 milliLiter(s) IV Push every 8 hours      REVIEW OF SYSTEMS:  CONSTITUTIONAL: No fever, no weight loss, or no fatigue  NECK: No pain, no stiffness  RESPIRATORY: No cough, no wheezing, no chills, no hemoptysis, No shortness of breath  CARDIOVASCULAR: No chest pain, no palpitations, no dizziness, no leg swelling  GASTROINTESTINAL: No abdominal pain. No nausea, no vomiting, no hematemesis; No diarrhea, no constipation. No melena, no hematochezia.  GENITOURINARY: No dysuria, no frequency, no hematuria, no incontinence  NEUROLOGICAL: No headaches, no loss of strength, no numbness, no tremors  SKIN: No itching, no burning  MUSCULOSKELETAL: No joint pain, no swelling; No muscle, no back, no extremity pain  PSYCHIATRIC: No depression, no mood swings,   HEME/LYMPH: No easy bruising, no bleeding gums  ALLERY AND IMMUNOLOGIC: No hives       Consultant(s) Notes Reviewed:  [x ] YES  [ ] NO    PHYSICAL EXAM:  GENERAL: NAD  HEAD:  Atraumatic, Normocephalic  EYES: EOMI, PERRLA, conjunctiva and sclera clear  ENMT: No tonsillar erythema, exudates, or enlargement; Moist mucous membranes  NECK: Supple, No JVD  NERVOUS SYSTEM:  Awake & alert  CHEST/LUNG: Clear to auscultation bilaterally; No rales, rhonchi, wheezing,  HEART: Regular rate and rhythm  ABDOMEN: Soft, Nontender, Nondistended; Bowel sounds present  EXTREMITIES:  No clubbing, cyanosis, or edema  LYMPH: No lymphadenopathy noted  SKIN: No rashes      Advanced care planning discussed with patient/family [x ] YES   [ ] NO
Patient is a 62y old  Female who presents with a chief complaint of BILATERAL KNEE PAIN   BILATERAL STAGED TOTAL KNEE REPLACEMENT (24 Oct 2017 06:08)  feels well, no complaints    INTERVAL HPI/OVERNIGHT EVENTS:  T(C): 36.8 (10-26-17 @ 08:27), Max: 36.8 (10-26-17 @ 08:27)  HR: 83 (10-26-17 @ 08:27) (77 - 88)  BP: 111/72 (10-26-17 @ 08:27) (111/72 - 129/75)  RR: 18 (10-26-17 @ 08:27) (16 - 18)  SpO2: 99% (10-26-17 @ 08:27) (96% - 99%)  Wt(kg): --  I&O's Summary    25 Oct 2017 07:01  -  26 Oct 2017 07:00  --------------------------------------------------------  IN: 240 mL / OUT: 0 mL / NET: 240 mL        LABS:                        10.2   x     )-----------( x        ( 25 Oct 2017 04:38 )             30.3     MEDICATIONS  (STANDING):  ascorbic acid 500 milliGRAM(s) Oral two times a day  aspirin 325 milliGRAM(s) Oral two times a day  celecoxib 200 milliGRAM(s) Oral two times a day after meals  docusate sodium 100 milliGRAM(s) Oral three times a day  ferrous    sulfate 325 milliGRAM(s) Oral three times a day with meals  folic acid 1 milliGRAM(s) Oral daily  multivitamin 1 Tablet(s) Oral daily  pantoprazole    Tablet 40 milliGRAM(s) Oral daily  sodium chloride 0.9% lock flush 3 milliLiter(s) IV Push every 8 hours    MEDICATIONS  (PRN):  acetaminophen   Tablet. 650 milliGRAM(s) Oral every 6 hours PRN Headache  HYDROmorphone   Tablet 2 milliGRAM(s) Oral every 4 hours PRN Moderate Pain (4 - 6)  HYDROmorphone   Tablet 4 milliGRAM(s) Oral every 4 hours PRN Severe Pain (7 - 10)  morphine  - Injectable 2 milliGRAM(s) IV Push every 4 hours PRN Severe Pain (7 - 10)  ondansetron Injectable 4 milliGRAM(s) IV Push every 6 hours PRN Nausea and/or Vomiting      REVIEW OF SYSTEMS:  CONSTITUTIONAL: No fever, weight loss, or fatigue  EYES: No eye pain, visual disturbances, or discharge  ENMT:  No difficulty hearing, tinnitus, vertigo; No sinus or throat pain  NECK: No pain or stiffness  RESPIRATORY: No cough, wheezing, chills or hemoptysis; No shortness of breath  CARDIOVASCULAR: No chest pain, palpitations, dizziness, or leg swelling  GASTROINTESTINAL: No abdominal or epigastric pain. No nausea, vomiting, or hematemesis; No diarrhea or constipation. No melena or hematochezia.  GENITOURINARY: No dysuria, frequency, hematuria, or incontinence  NEUROLOGICAL: No headaches, memory loss, loss of strength, numbness, or tremors  SKIN: No itching, burning, rashes, or lesions   LYMPH NODES: No enlarged glands  ENDOCRINE: No heat or cold intolerance; No hair loss  MUSCULOSKELETAL: knee pain much improved  PSYCHIATRIC: No depression, anxiety, mood swings, or difficulty sleeping  HEME/LYMPH: No easy bruising, or bleeding gums  ALLERY AND IMMUNOLOGIC: No hives or eczema    RADIOLOGY & ADDITIONAL TESTS:    Imaging Personally Reviewed:  [ ] YES  [ ] NO    Consultant(s) Notes Reviewed:  [ ] YES  [ ] NO    PHYSICAL EXAM:  GENERAL: NAD, well-groomed, well-developed  HEAD:  Atraumatic, Normocephalic  EYES: EOMI, PERRLA, conjunctiva and sclera clear  ENMT: No tonsillar erythema, exudates, or enlargement; Moist mucous membranes, Good dentition, No lesions  NECK: Supple, No JVD, Normal thyroid  NERVOUS SYSTEM:  Alert & Oriented X3, Good concentration; Motor Strength 5/5 B/L upper and lower extremities; DTRs 2+ intact and symmetric  CHEST/LUNG: Clear to percussion bilaterally; No rales, rhonchi, wheezing, or rubs  HEART: Regular rate and rhythm; No murmurs, rubs, or gallops  ABDOMEN: Soft, Nontender, Nondistended; Bowel sounds present  EXTREMITIES:  2+ Peripheral Pulses, No clubbing, cyanosis, or edema  LYMPH: No lymphadenopathy noted  SKIN: No rashes or lesions    Care Discussed with Consultants/Other Providers [x ] YES  [ ] NO
Patient is a 62y old  Female who presents with a chief complaint of Left Knee replacement (16 Oct 2017 09:03)      INTERVAL HPI/OVERNIGHT EVENTS:    Vital Signs Last 24 Hrs  T(C): 36.8 (21 Oct 2017 07:24), Max: 36.9 (20 Oct 2017 15:22)  T(F): 98.2 (21 Oct 2017 07:24), Max: 98.5 (21 Oct 2017 00:20)  HR: 85 (21 Oct 2017 07:24) (80 - 90)  BP: 114/72 (21 Oct 2017 07:24) (105/63 - 137/82)  BP(mean): --  RR: 17 (21 Oct 2017 07:24) (17 - 18)  SpO2: 99% (21 Oct 2017 07:24) (99% - 100%)I&O's Summary      LABS:              CAPILLARY BLOOD GLUCOSE              ascorbic acid 500 milliGRAM(s) Oral two times a day  celecoxib 200 milliGRAM(s) Oral two times a day after meals  docusate sodium 100 milliGRAM(s) Oral three times a day  enoxaparin Injectable 40 milliGRAM(s) SubCutaneous daily  ferrous    sulfate 325 milliGRAM(s) Oral three times a day with meals  folic acid 1 milliGRAM(s) Oral daily  HYDROmorphone   Tablet 2 milliGRAM(s) Oral every 4 hours PRN  HYDROmorphone   Tablet 4 milliGRAM(s) Oral every 4 hours PRN  morphine  - Injectable 2 milliGRAM(s) IV Push every 4 hours PRN  multivitamin 1 Tablet(s) Oral daily  ondansetron Injectable 4 milliGRAM(s) IV Push every 6 hours PRN  pantoprazole    Tablet 40 milliGRAM(s) Oral daily  sodium chloride 0.9% lock flush 3 milliLiter(s) IV Push every 8 hours      REVIEW OF SYSTEMS:  CONSTITUTIONAL: No fever, weight loss, or fatigue  NECK: No pain or stiffness  RESPIRATORY: No cough, wheezing, chills or hemoptysis; No shortness of breath  CARDIOVASCULAR: No chest pain, palpitations, dizziness, or leg swelling  GASTROINTESTINAL: No abdominal or epigastric pain. No nausea, vomiting, or hematemesis; No diarrhea or constipation. No melena or hematochezia.  GENITOURINARY: No dysuria, frequency, hematuria, or incontinence  NEUROLOGICAL: No headaches, loss of strength, numbness, or tremors  SKIN: No itching, burning  MUSCULOSKELETAL: No joint pain or swelling; No muscle, back, or extremity pain  PSYCHIATRIC: No depression, mood swings, HEME/LYMPH: No easy bruising, or bleeding gums  ALLERY AND IMMUNOLOGIC: No hives       Consultant(s) Notes Reviewed:  [x ] YES  [ ] NO    PHYSICAL EXAM:  GENERAL: NAD, well-groomed, well-developed  HEAD:  Atraumatic, Normocephalic  EYES: EOMI, PERRLA, conjunctiva and sclera clear  ENMT: No tonsillar erythema, exudates, or enlargement; Moist mucous membranes  NECK: Supple, No JVD  NERVOUS SYSTEM:  Awake & alert  CHEST/LUNG: Clear to auscultation bilaterally; No rales, rhonchi, wheezing,  HEART: Regular rate and rhythm  ABDOMEN: Soft, Nontender, Nondistended; Bowel sounds present  EXTREMITIES:  No clubbing, cyanosis, or edema  LYMPH: No lymphadenopathy noted  SKIN: No rashes      Advanced care planning discussed with patient/family x[ ] YES   [ ] NO
Patient is a 62y old  Female who presents with a chief complaint of Left Knee replacement (16 Oct 2017 09:03)      INTERVAL HPI/OVERNIGHT EVENTS:  T(C): 36.9 (10-20-17 @ 15:22), Max: 36.9 (10-20-17 @ 15:22)  HR: 90 (10-20-17 @ 15:22) (86 - 90)  BP: 137/82 (10-20-17 @ 15:22) (112/74 - 144/84)  RR: 18 (10-20-17 @ 15:22) (16 - 18)  SpO2: 100% (10-20-17 @ 15:22) (100% - 100%)  Wt(kg): --  I&O's Summary      PAST MEDICAL & SURGICAL HISTORY:  Unilateral primary osteoarthritis, left knee  H/O abdominal hysterectomy: 2002      SOCIAL HISTORY  Alcohol:  Tobacco:  Illicit substance use:      FAMILY HISTORY:      LABS:              CAPILLARY BLOOD GLUCOSE                MEDICATIONS  (STANDING):  ascorbic acid 500 milliGRAM(s) Oral two times a day  celecoxib 200 milliGRAM(s) Oral two times a day after meals  docusate sodium 100 milliGRAM(s) Oral three times a day  enoxaparin Injectable 40 milliGRAM(s) SubCutaneous daily  ferrous    sulfate 325 milliGRAM(s) Oral three times a day with meals  folic acid 1 milliGRAM(s) Oral daily  multivitamin 1 Tablet(s) Oral daily  pantoprazole    Tablet 40 milliGRAM(s) Oral daily  sodium chloride 0.9% lock flush 3 milliLiter(s) IV Push every 8 hours    MEDICATIONS  (PRN):  HYDROmorphone   Tablet 2 milliGRAM(s) Oral every 4 hours PRN Moderate Pain (4 - 6)  HYDROmorphone   Tablet 4 milliGRAM(s) Oral every 4 hours PRN Severe Pain (7 - 10)  morphine  - Injectable 2 milliGRAM(s) IV Push every 4 hours PRN Severe Pain (7 - 10)  ondansetron Injectable 4 milliGRAM(s) IV Push every 6 hours PRN Nausea and/or Vomiting      REVIEW OF SYSTEMS:  CONSTITUTIONAL: No fever, weight loss, or fatigue  EYES: No eye pain, visual disturbances, or discharge  ENMT:  No difficulty hearing, tinnitus, vertigo; No sinus or throat pain  NECK: No pain or stiffness  RESPIRATORY: No cough, wheezing, chills or hemoptysis; No shortness of breath  CARDIOVASCULAR: No chest pain, palpitations, dizziness, or leg swelling  GASTROINTESTINAL: No abdominal or epigastric pain. No nausea, vomiting, or hematemesis; No diarrhea or constipation. No melena or hematochezia.  GENITOURINARY: No dysuria, frequency, hematuria, or incontinence  NEUROLOGICAL: No headaches, memory loss, loss of strength, numbness, or tremors  SKIN: No itching, burning, rashes, or lesions   LYMPH NODES: No enlarged glands  ENDOCRINE: No heat or cold intolerance; No hair loss  MUSCULOSKELETAL: No joint pain or swelling; No muscle, back, or extremity pain  PSYCHIATRIC: No depression, anxiety, mood swings, or difficulty sleeping  HEME/LYMPH: No easy bruising, or bleeding gums  ALLERY AND IMMUNOLOGIC: No hives or eczema    RADIOLOGY & ADDITIONAL TESTS:    Imaging Personally Reviewed:  [ ] YES  [ ] NO    Consultant(s) Notes Reviewed:  [ ] YES  [ ] NO    PHYSICAL EXAM:  GENERAL: NAD, well-groomed, well-developed  HEAD:  Atraumatic, Normocephalic  EYES: EOMI, PERRLA, conjunctiva and sclera clear  ENMT: No tonsillar erythema, exudates, or enlargement; Moist mucous membranes, Good dentition, No lesions  NECK: Supple, No JVD, Normal thyroid  NERVOUS SYSTEM:  Alert & Oriented X3, Good concentration; Motor Strength 5/5 B/L upper and lower extremities; DTRs 2+ intact and symmetric  CHEST/LUNG: Clear to percussion bilaterally; No rales, rhonchi, wheezing, or rubs  HEART: Regular rate and rhythm; No murmurs, rubs, or gallops  ABDOMEN: Soft, Nontender, Nondistended; Bowel sounds present  EXTREMITIES:  2+ Peripheral Pulses, No clubbing, cyanosis, or edema  LYMPH: No lymphadenopathy noted  SKIN: No rashes or lesions    Care Discussed with Consultants/Other Providers [ ] YES  [ ] NO
Patient is a 62y old  Female who presents with a chief complaint of Left Knee replacement (16 Oct 2017 09:03)  now s/p bl tkr.  feels well       INTERVAL HPI/OVERNIGHT EVENTS:  T(C): 36.8 (10-23-17 @ 15:35), Max: 37 (10-23-17 @ 14:28)  HR: 71 (10-23-17 @ 15:35) (71 - 84)  BP: 117/76 (10-23-17 @ 15:35) (117/55 - 136/79)  RR: 16 (10-23-17 @ 15:35) (16 - 25)  SpO2: 100% (10-23-17 @ 15:35) (94% - 100%)  Wt(kg): --  I&O's Summary    22 Oct 2017 07:01  -  23 Oct 2017 07:00  --------------------------------------------------------  IN: 875 mL / OUT: 0 mL / NET: 875 mL    23 Oct 2017 07:01  -  23 Oct 2017 15:44  --------------------------------------------------------  IN: 200 mL / OUT: 0 mL / NET: 200 mL        LABS:                        9.5    x     )-----------( x        ( 23 Oct 2017 13:42 )             29.9     10-22    140  |  105  |  18  ----------------------------<  156<H>  3.9   |  29  |  0.64    Ca    8.6      22 Oct 2017 20:10      PT/INR - ( 23 Oct 2017 04:45 )   PT: 11.8 sec;   INR: 1.08 ratio         PTT - ( 23 Oct 2017 04:45 )  PTT:33.1 sec        MEDICATIONS  (STANDING):  acetaminophen  IVPB. 1000 milliGRAM(s) IV Intermittent once  ascorbic acid 500 milliGRAM(s) Oral two times a day  celecoxib 200 milliGRAM(s) Oral two times a day after meals  docusate sodium 100 milliGRAM(s) Oral three times a day  ferrous    sulfate 325 milliGRAM(s) Oral three times a day with meals  folic acid 1 milliGRAM(s) Oral daily  multivitamin 1 Tablet(s) Oral daily  pantoprazole    Tablet 40 milliGRAM(s) Oral daily  sodium chloride 0.9% lock flush 3 milliLiter(s) IV Push every 8 hours  sodium chloride 0.9%. 1000 milliLiter(s) (75 mL/Hr) IV Continuous <Continuous>  vancomycin  IVPB 1500 milliGRAM(s) IV Intermittent every 12 hours    MEDICATIONS  (PRN):  acetaminophen   Tablet. 650 milliGRAM(s) Oral every 6 hours PRN Headache  HYDROmorphone   Tablet 2 milliGRAM(s) Oral every 4 hours PRN Moderate Pain (4 - 6)  HYDROmorphone   Tablet 4 milliGRAM(s) Oral every 4 hours PRN Severe Pain (7 - 10)  morphine  - Injectable 2 milliGRAM(s) IV Push every 4 hours PRN Severe Pain (7 - 10)  ondansetron Injectable 4 milliGRAM(s) IV Push every 6 hours PRN Nausea and/or Vomiting      REVIEW OF SYSTEMS:  CONSTITUTIONAL: No fever, weight loss, or fatigue  EYES: No eye pain, visual disturbances, or discharge  ENMT:  No difficulty hearing, tinnitus, vertigo; No sinus or throat pain  NECK: No pain or stiffness  RESPIRATORY: No cough, wheezing, chills or hemoptysis; No shortness of breath  CARDIOVASCULAR: No chest pain, palpitations, dizziness, or leg swelling  GASTROINTESTINAL: No abdominal or epigastric pain. No nausea, vomiting, or hematemesis; No diarrhea or constipation. No melena or hematochezia.  GENITOURINARY: No dysuria, frequency, hematuria, or incontinence  NEUROLOGICAL: No headaches, memory loss, loss of strength, numbness, or tremors  SKIN: No itching, burning, rashes, or lesions   LYMPH NODES: No enlarged glands  ENDOCRINE: No heat or cold intolerance; No hair loss  MUSCULOSKELETAL: chronic knee pain  PSYCHIATRIC: No depression, anxiety, mood swings, or difficulty sleeping  HEME/LYMPH: No easy bruising, or bleeding gums  ALLERY AND IMMUNOLOGIC: No hives or eczema    RADIOLOGY & ADDITIONAL TESTS:    Imaging Personally Reviewed:  [ ] YES  [ ] NO    Consultant(s) Notes Reviewed:  [ ] YES  [ ] NO    PHYSICAL EXAM:  GENERAL: NAD, well-groomed, well-developed  HEAD:  Atraumatic, Normocephalic  EYES: EOMI, PERRLA, conjunctiva and sclera clear  ENMT: No tonsillar erythema, exudates, or enlargement; Moist mucous membranes, Good dentition, No lesions  NECK: Supple, No JVD, Normal thyroid  NERVOUS SYSTEM:  Alert & Oriented X3, Good concentration; Motor Strength 5/5 B/L upper and lower extremities; DTRs 2+ intact and symmetric  CHEST/LUNG: Clear to percussion bilaterally; No rales, rhonchi, wheezing, or rubs  HEART: Regular rate and rhythm; No murmurs, rubs, or gallops  ABDOMEN: Soft, Nontender, Nondistended; Bowel sounds present  EXTREMITIES:  2+ Peripheral Pulses, No clubbing, cyanosis, or edema, no calf tenderness to palp  LYMPH: No lymphadenopathy noted  SKIN: No rashes or lesions    Care Discussed with Consultants/Other Providers [ ] YES  [ ] NO
Pt S/E at bedside, no acute events overnight, pain controlled    AVSS  Gen: NAD, AAOx3    Left Lower Extremity:  Dressing clean dry intact  +EHL/FHL/TA/GS  SILT L3-S1  +DP/PT Pulses  Compartments soft  No calf TTP B/L
Pt S/E at bedside, tolerated procedure well, pain controlled    Vital Signs Last 24 Hrs  T(C): 36.5 (16 Oct 2017 13:40), Max: 36.8 (16 Oct 2017 08:58)  T(F): 97.7 (16 Oct 2017 13:40), Max: 98.2 (16 Oct 2017 08:58)  HR: 65 (16 Oct 2017 14:10) (60 - 69)  BP: 140/70 (16 Oct 2017 14:10) (127/65 - 142/83)  RR: 17 (16 Oct 2017 14:10) (16 - 18)  SpO2: 100% (16 Oct 2017 14:10) (96% - 100%)    Gen: NAD, AAOx3    Left Lower Extremity:  Dressing clean dry intact  +EHL/FHL/TA/GS  SILT L3-S1  +DP/PT Pulses  Compartments soft  No calf TTP B/L  Venodynes in place
The patient was interviewed and evaluated  62y Female s/p right TKR    T(C): 36.5 (10-24-17 @ 07:25), Max: 37.1 (10-24-17 @ 04:30)  HR: 90 (10-24-17 @ 07:25) (71 - 90)  BP: 109/68 (10-24-17 @ 07:25) (109/68 - 140/80)  RR: 16 (10-24-17 @ 07:25) (16 - 25)  SpO2: 98% (10-24-17 @ 07:25) (94% - 100%)  Wt(kg): --    Pt seen, doing well, no anesthesia complications or complaints noted or reported.   No Nausea    No additional recommendations.     Pain well controlled
Patient is a 62y old  Female who presents with a chief complaint of Left Knee replacement (16 Oct 2017 09:03)      INTERVAL HPI/OVERNIGHT EVENTS: Patient seen and examined. NAD. No complaints.      Vital Signs Last 24 Hrs  T(C): 36.7 (21 Oct 2017 23:59), Max: 36.7 (21 Oct 2017 23:59)  T(F): 98.1 (21 Oct 2017 23:59), Max: 98.1 (21 Oct 2017 23:59)  HR: 77 (21 Oct 2017 23:59) (77 - 77)  BP: 121/74 (21 Oct 2017 23:59) (121/74 - 121/74)  BP(mean): --  RR: 16 (21 Oct 2017 23:59) (16 - 16)  SpO2: 96% (21 Oct 2017 23:59) (96% - 96%)I&O's Summary      LABS:                        9.0    8.2   )-----------( 271      ( 22 Oct 2017 08:18 )             28.4     10-22    142  |  104  |  20  ----------------------------<  116<H>  3.9   |  29  |  0.75    Ca    8.8      22 Oct 2017 08:18      PT/INR - ( 22 Oct 2017 08:18 )   PT: 11.7 sec;   INR: 1.07 ratio             CAPILLARY BLOOD GLUCOSE              ascorbic acid 500 milliGRAM(s) Oral two times a day  celecoxib 200 milliGRAM(s) Oral two times a day after meals  docusate sodium 100 milliGRAM(s) Oral three times a day  enoxaparin Injectable 40 milliGRAM(s) SubCutaneous daily  ferrous    sulfate 325 milliGRAM(s) Oral three times a day with meals  folic acid 1 milliGRAM(s) Oral daily  HYDROmorphone   Tablet 2 milliGRAM(s) Oral every 4 hours PRN  HYDROmorphone   Tablet 4 milliGRAM(s) Oral every 4 hours PRN  morphine  - Injectable 2 milliGRAM(s) IV Push every 4 hours PRN  multivitamin 1 Tablet(s) Oral daily  ondansetron Injectable 4 milliGRAM(s) IV Push every 6 hours PRN  pantoprazole    Tablet 40 milliGRAM(s) Oral daily  sodium chloride 0.9% lock flush 3 milliLiter(s) IV Push every 8 hours  sodium chloride 0.9%. 1000 milliLiter(s) IV Continuous <Continuous>      REVIEW OF SYSTEMS:  CONSTITUTIONAL: No fever, weight loss, or fatigue  NECK: No pain or stiffness  RESPIRATORY: No cough, wheezing, chills or hemoptysis; No shortness of breath  CARDIOVASCULAR: No chest pain, palpitations, dizziness, or leg swelling  GASTROINTESTINAL: No abdominal or epigastric pain. No nausea, vomiting, or hematemesis; No diarrhea or constipation. No melena or hematochezia.  GENITOURINARY: No dysuria, frequency, hematuria, or incontinence  NEUROLOGICAL: No headaches, loss of strength, numbness, or tremors  SKIN: No itching, burning  MUSCULOSKELETAL: No joint pain or swelling; No muscle, back, or extremity pain  PSYCHIATRIC: No depression, mood swings, HEME/LYMPH: No easy bruising, or bleeding gums  ALLERY AND IMMUNOLOGIC: No hives       Consultant(s) Notes Reviewed:  [x ] YES  [ ] NO    PHYSICAL EXAM:  GENERAL: NAD, well-groomed, well-developed  HEAD:  Atraumatic, Normocephalic  EYES: EOMI, PERRLA, conjunctiva and sclera clear  ENMT: No tonsillar erythema, exudates, or enlargement; Moist mucous membranes  NECK: Supple, No JVD  NERVOUS SYSTEM:  Awake & alert  CHEST/LUNG: Clear to auscultation bilaterally; No rales, rhonchi, wheezing,  HEART: Regular rate and rhythm  ABDOMEN: Soft, Nontender, Nondistended; Bowel sounds present  EXTREMITIES:  No clubbing, cyanosis, or edema  LYMPH: No lymphadenopathy noted  SKIN: No rashes      Advanced care planning discussed with patient/family [x ] YES   [ ] NO
Pt S/E at bedside, no acute events overnight, pain controlled    Vital Signs Last 24 Hrs  T(C): 36.7 (21 Oct 2017 23:59), Max: 36.7 (21 Oct 2017 23:59)  T(F): 98.1 (21 Oct 2017 23:59), Max: 98.1 (21 Oct 2017 23:59)  HR: 77 (21 Oct 2017 23:59) (77 - 77)  BP: 121/74 (21 Oct 2017 23:59) (121/74 - 121/74)  RR: 16 (21 Oct 2017 23:59) (16 - 16)  SpO2: 96% (21 Oct 2017 23:59) (96% - 96%)    Gen: NAD, AAOx3    Left Lower Extremity:  Dressing clean dry intact  +EHL/FHL/TA/GS  SILT L3-S1  +DP/PT Pulses  Compartments soft  No calf TTP B/L

## 2017-10-29 DIAGNOSIS — M17.0 BILATERAL PRIMARY OSTEOARTHRITIS OF KNEE: ICD-10-CM

## 2017-10-29 DIAGNOSIS — D62 ACUTE POSTHEMORRHAGIC ANEMIA: ICD-10-CM

## 2018-12-19 NOTE — ASU PREOP CHECKLIST - AS BP NONINV METHOD
electronic no myalgia/no stiffness/no leg pain L/no arthralgia/no muscle cramps/no arm pain L/no arm pain R/no joint swelling/no muscle weakness/no back pain/no neck pain

## 2019-10-25 NOTE — PROGRESS NOTE ADULT - PROBLEM SELECTOR PLAN 2
Patient : Herlinda Wilkins Age: 28 year old Sex: female   MRN: [id number] Encounter Date: 10/24/2019    E18/18    History     Chief Complaint   Patient presents with   • Shortness of Breath     HPI  10/24/2019  11:32 PM Herlinda Wilkins is a 28 year old female with a h/o asthma who presents to the ED for evaluation of SOB that began four hours ago. The pt states she has used her inhaler and a nebulizer tonight with no relief. The pt denies fevers, chills, a cough, nausea, vomiting, or any other associated symptoms. She also denies EtOH, illicit drug, or cigarette use. There are no further complaints or modifying factors at this time.     PCP: Dax Mariscal MD    No Known Allergies    Current Outpatient Medications   Medication Sig   • albuterol 108 (90 BASE) MCG/ACT inhaler Inhale 2 puffs into the lungs every 4 hours as needed for Wheezing.   • fluticasone-salmeterol (ADVAIR DISKUS) 250-50 MCG/DOSE AEPB Inhale 1 puff into the lungs two times daily.   • fluticasone-salmeterol (ADVAIR DISKUS) 250-50 MCG/DOSE AEPB Inhale 1 puff into the lungs 2 times daily.   • albuterol (PROAIR HFA) 108 (90 BASE) MCG/ACT inhaler Inhale 2 puffs into the lungs every 4 hours as needed (for cough).   • cetirizine (ZYRTEC) 10 MG tablet Take 1 tablet by mouth daily.         Past Medical History:   Diagnosis Date   • Allergy    • Asthma    • Environmental allergies     Dust, cats     History reviewed. No pertinent past surgical history.     Family History   Problem Relation Age of Onset   • Genitourinary Mother         ovarian cyst   • Asthma Father        Social History     Tobacco Use   • Smoking status: Never Smoker   • Smokeless tobacco: Never Used   Substance Use Topics   • Alcohol use: Yes     Comment: once per month   • Drug use: No       Review of Systems   Constitutional: Negative for chills and fever.   HENT: Negative for congestion, ear pain, rhinorrhea and sore throat.    Eyes: Negative for visual disturbance.   Respiratory: 
Positive for shortness of breath. Negative for cough.    Cardiovascular: Negative for chest pain.   Gastrointestinal: Negative for abdominal pain, diarrhea, nausea and vomiting.   Genitourinary: Negative for dysuria, vaginal bleeding and vaginal discharge.   Musculoskeletal: Negative for myalgias.   Skin: Negative for rash.   Neurological: Negative for dizziness, weakness, numbness and headaches.   Hematological: Negative for adenopathy. Does not bruise/bleed easily.   All other systems reviewed and are negative.      Physical Exam     ED Triage Vitals [10/24/19 2329]   ED Triage Vitals Group      Temp 98.1 °F (36.7 °C)      Pulse 124      Resp 19      BP (!) 155/107      SpO2 95 %      EtCO2 mmHg       Height 5' 4\" (1.626 m)      Weight 184 lb 4.9 oz (83.6 kg)      Weight Scale Used ED Actual       Physical Exam   Constitutional: She is oriented to person, place, and time. She appears well-developed and well-nourished. She appears distressed.   HENT:   Head: Normocephalic and atraumatic.   Nose: Nose normal.   Mouth/Throat: Oropharynx is clear and moist.   Eyes: Pupils are equal, round, and reactive to light. EOM are normal. No scleral icterus.   Neck: Normal range of motion. Neck supple. No JVD present.   Cardiovascular: Regular rhythm, normal heart sounds and intact distal pulses. Tachycardia present. Exam reveals no gallop and no friction rub.   No murmur heard.  Pulmonary/Chest: Accessory muscle usage present. No stridor. Tachypnea noted. She is in respiratory distress. She has decreased breath sounds. She has wheezes (expiratory). She has no rales. She exhibits no tenderness.   Abdominal: Soft. Bowel sounds are normal. She exhibits no distension and no mass. There is no tenderness. There is no rebound and no guarding. Musculoskeletal: Normal range of motion.         General: No tenderness or edema.     Lymphadenopathy:     She has no cervical adenopathy.   Neurological: She is alert and oriented to person, 
place, and time. No cranial nerve deficit. She exhibits normal muscle tone. Coordination normal.   Skin: Skin is warm and dry. No rash noted. She is not diaphoretic. No erythema.   Psychiatric: She has a normal mood and affect. Judgment and thought content normal.   Nursing note and vitals reviewed.      ED Course     Procedures    Lab Results     Results for orders placed or performed during the hospital encounter of 10/24/19   CBC with Automated Differential   Result Value Ref Range    WBC 13.0 (H) 4.2 - 11.0 K/mcL    RBC 5.33 (H) 4.00 - 5.20 mil/mcL    HGB 15.8 (H) 12.0 - 15.5 g/dL    HCT 46.6 (H) 36.0 - 46.5 %    MCV 87.4 78.0 - 100.0 fl    MCH 29.6 26.0 - 34.0 pg    MCHC 33.9 32.0 - 36.5 g/dL    RDW-CV 11.7 11.0 - 15.0 %     140 - 450 K/mcL    NRBC 0 0 /100 WBC    DIFF TYPE AUTOMATED DIFFERENTIAL     Neutrophil 59 %    LYMPH 25 %    MONO 6 %    EOSIN 9 %    BASO 1 %    Percent Immature Granuloctyes 0 %    Absolute Neutrophil 7.7 1.8 - 7.7 K/mcL    Absolute Lymph 3.2 1.0 - 4.8 K/mcL    Absolute Mono 0.8 0.3 - 0.9 K/mcL    Absolute Eos 1.2 (H) 0.1 - 0.5 K/mcL    Absolute Baso 0.1 0.0 - 0.3 K/mcL    Absolute Immature Granulocytes 0.1 0 - 0.2 K/mcl   COMPREHENSIVE METABOLIC PANEL   Result Value Ref Range    Sodium 139 135 - 145 mmol/L    Potassium 3.6 3.4 - 5.1 mmol/L    Chloride 103 98 - 107 mmol/L    Carbon Dioxide 27 21 - 32 mmol/L    Anion Gap 13 10 - 20 mmol/L    Glucose 90 65 - 99 mg/dL    BUN 12 6 - 20 mg/dL    Creatinine 0.76 0.51 - 0.95 mg/dL    GFR Estimate,  >90     GFR Estimate, Non African American >90     BUN/Creatinine Ratio 16 7 - 25    CALCIUM 8.9 8.4 - 10.2 mg/dL    TOTAL BILIRUBIN 0.3 0.2 - 1.0 mg/dL    AST/SGOT 19 <38 Units/L    ALT/SGPT 20 <64 Units/L    ALK PHOSPHATASE 71 45 - 117 Units/L    TOTAL PROTEIN 8.0 6.4 - 8.2 g/dL    Albumin 4.2 3.6 - 5.1 g/dL    GLOBULIN 3.8 2.0 - 4.0 g/dL    A/G Ratio, Serum 1.1 1.0 - 2.4   Lipase   Result Value Ref Range    Lipase 120 73 - 
393 Units/L   NT proBNP   Result Value Ref Range    NT proBNP <5 <126 pg/mL   Troponin I - Point of Care   Result Value Ref Range    Troponin I POC <0.10 <0.10 ng/mL       EKG Results     EKG Interpretation  Rate: 119  Rhythm: sinus tachycardia   Abnormality: Right atrial enlargement. Nonspecific ST and T wave abnormality. No previous EKGs available for comparison.     EKG interpreted by ED physician    Radiology Results     Imaging Results          XR Chest AP or PA (Final result)  Result time 10/25/19 00:49:09    Final result                 Impression:    FINDINGS/IMPRESSION:      The heart size and central vasculature are within normal limits. There is  no evidence of dense focal consolidation or significant effusion.               Narrative:    EXAM: XR CHEST AP OR PA    INDICATION:  sob    COMPARISON:  None.                                ED Medication Orders (From admission, onward)    Ordered Start     Status Ordering Provider    10/24/19 2337 10/24/19 2338  methylPREDNISolone (SOLU-Medrol) PF injection 125 mg  ONCE      Last MAR action:  Given TRAVIS [facility]    10/24/19 2337 10/24/19 2335    PRN      Discontinued TRAVIS [facility]    10/24/19 2337 10/24/19 2338    2 times per day      Discontinued TRAVIS [facility]    10/24/19 2337 10/24/19 2338  sodium chloride (NORMAL SALINE) 0.9 % bolus 1,000 mL  ONCE      Last MAR action:  Completed [facility]    10/24/19 2337 10/24/19 2338  albuterol (VENTOLIN) nebulizer 15 mg  (albuterol (VENTOLIN) nebulizer 2.5 mg/0.5 mL)  ONCE      Last MAR action:  Given TRAVIS [facility]    10/24/19 2337 10/24/19 2338  magnesium sulfate 2 g in 50 mL premix IVPB  ONCE      Last MAR action:  Completed [facility]               [facility]  Vitals  Vitals:    10/25/19 0000 10/25/19 0030 10/25/19 0100 10/25/19 0130   BP: 112/81 127/67 117/71 126/80   Pulse: 108 112 110 110   Resp: 28 23 20 22   Temp:       TempSrc:       SpO2: 98% 98% 97% 94%   Weight:       Height:           ED 
Course  Initial Impression: Pt with a h/o asthma presents with SOB. On exam, the pt is distressed, tachypneic, tachycardic, and has expiratory wheezing. Plan for labwork, breathing treatment. Pt understands and agrees with the plan. All questions addressed at this time.     12:53 AM I spoke with the respiratory therapist, who states that the pt has finished her breathing treatment and reports feeling much better.     1:28 AM I rechecked the pt who is resting comfortably and reports improvement in her symptoms. She is no longer in distress. On repeat auscultation, the pt's lungs are clear with good air entry and no wheezes. I offered the pt the option of continued monitoring in the ED, which she declined. I will prescribe steroids and an inhaler. The pt should f/u with her PCP regarding her symptoms. I advised the pt to return to the ED for any new or worsening symptoms. The pt understands and agrees with the plan. All questions were addressed at this time.     MDM  Critical Care time spent on this patient outside of billable procedures: 51 minutes    Clinical Impression  ED Diagnosis        Final diagnosis    Moderate asthma with exacerbation, unspecified whether persistent                The patient was provided with a recommendation to follow up with a primary care provider and obtain reassessment of his/her blood pressure within three months.    Follow Up:  Dax Mariscal MD  [address] [place] [id number] [phone]    In 1 week  return for any problems or worsening.       Discharge Medication List as of 10/25/2019  1:32 AM      START taking these medications    Details   !! albuterol (VENTOLIN HFA) 108 (90 Base) MCG/ACT inhaler Inhale 2 puffs into the lungs every 4 hours as needed for Shortness of Breath or Wheezing.Eprescribe, Disp-1 Inhaler, R-0      predniSONE (DELTASONE) 20 MG tablet Take 2 tablets by mouth daily.Eprescribe, Disp-10 tablet, R-0       !! - Potential duplicate medications 
found. Please discuss with provider.          Pt is discharged to home/self care in stable condition.       I have reviewed the information recorded by the scribe for accuracy and agree with its contents.    ____________________________________________________________________    Rajni Addison acting as a scribe for Dr. Gamaliel Chanel  Dictation # [id number]  Scribe: Rajni Chanel MD  10/25/19 0503    
Continue post-op care  PT  ortho f/u
Continue post-op care  PT  ortho f/u  DVT prophylaxis with ASA 325mg bid  D/C planning to [facility] in AM
Continue post-op care  PT  ortho f/u  DVT prophylaxis with ASA 325mg bid  D/C planning to [facility] in AM
For R TKR on 10/23/17  Routine labs in AM  May proceed for OR for R TKR
pod 0 rtkr today  dvt proph w bid asa
Continue post-op care  PT  ortho f/u

## 2022-02-01 NOTE — PATIENT PROFILE ADULT. - NS PRO MODE OF ARRIVAL
[place] [facility] PROGRESS NOTE      Patient: Minerva Mar        YOB: 1950  Age: 80 y.o. Gender: female              Assessment: Karyn\" as she likes to be called is a 80year old female who is being referred for out -patient spiritual care services [facility]. This  called her and she answered the phone. Khurram Rhodes is upbeat on the phone and spoke of her good report stating that , \"all margins are clear. \"      Interventions:  Out out-patient spiritual care services were reviewed with her. She is ok for now and stated she is not in need as she is well supported      Plan:    1. A letter outlining our services and giving her our contact information is being mailed out today. She will call us as needed.      Electronically signed by Bharati Barger, on 2/1/2022 at 2:54 PM.  Ajay Flowers  [phone]
ambulatory

## 2022-03-02 NOTE — PROGRESS NOTE ADULT - PROBLEM SELECTOR PROBLEM 3
[Medication Risks Reviewed] : Medication risks reviewed
[de-identified] : 60 
Anemia due to blood loss
Prophylactic measure

## 2022-03-07 NOTE — PATIENT PROFILE ADULT. - NS TRANSFER PATIENT BELONGINGS
Clothing Transitions of Care Status:  1.  Name of PCP:     Beni Gonzalez MD (PCP-Cardiology)  [phone]  2.  PCP Contacted on Admission: [x ] Y    [ ] N  by the ER attending.  3.  PCP contacted at Discharge: [ ] Y    [ ] N    [ ] N/A  4.  Post-Discharge Appointment Date and Location:  5.  Summary of Handoff given to PCP:

## 2022-04-22 NOTE — BRIEF OPERATIVE NOTE - SURGICAL START DATE/TIME
Impression:  Encounter for surgical aftercare following surgery on a sense organ  Z48.810. Plan: PO instructions reviewed.
23-Oct-2017
16-Oct-2017

## 2022-07-18 NOTE — PHYSICAL THERAPY INITIAL EVALUATION ADULT - LEVEL OF INDEPENDENCE: SUPINE/SIT, REHAB EVAL
minimum assist (75% patients effort)
minimum assist (75% patients effort)
Hatchet Flap Text: The defect edges were debeveled with a #15 scalpel blade.  Given the location of the defect, shape of the defect and the proximity to free margins a hatchet flap was deemed most appropriate.  Using a sterile surgical marker, an appropriate hatchet flap was drawn incorporating the defect and placing the expected incisions within the relaxed skin tension lines where possible.    The area thus outlined was incised deep to adipose tissue with a #15 scalpel blade.  The skin margins were undermined to an appropriate distance in all directions utilizing iris scissors.

## 2024-03-15 ENCOUNTER — RX ONLY (RX ONLY)
Age: 69
End: 2024-03-15

## 2024-03-15 ENCOUNTER — OFFICE (OUTPATIENT)
Dept: URBAN - METROPOLITAN AREA CLINIC 104 | Facility: CLINIC | Age: 69
Setting detail: OPHTHALMOLOGY
End: 2024-03-15
Payer: MEDICARE

## 2024-03-15 DIAGNOSIS — H25.13: ICD-10-CM

## 2024-03-15 DIAGNOSIS — H16.223: ICD-10-CM

## 2024-03-15 DIAGNOSIS — H43.813: ICD-10-CM

## 2024-03-15 PROCEDURE — 92014 COMPRE OPH EXAM EST PT 1/>: CPT | Performed by: OPTOMETRIST

## 2024-03-15 ASSESSMENT — REFRACTION_CURRENTRX
OD_OVR_VA: 20/
OS_OVR_VA: 20/

## 2024-03-15 ASSESSMENT — REFRACTION_MANIFEST
OS_SPHERE: +1.25
OS_AXIS: 150
OS_CYLINDER: -0.50
OD_AXIS: 025
OS_VA1: 20/20
OD_SPHERE: +2.00
OD_VA1: 20/20-
OD_CYLINDER: -0.50

## 2024-03-15 ASSESSMENT — SPHEQUIV_DERIVED
OD_SPHEQUIV: 1.75
OS_SPHEQUIV: 1

## 2025-03-25 ENCOUNTER — OFFICE (OUTPATIENT)
Dept: URBAN - METROPOLITAN AREA CLINIC 104 | Facility: CLINIC | Age: 70
Setting detail: OPHTHALMOLOGY
End: 2025-03-25
Payer: MEDICARE

## 2025-03-25 DIAGNOSIS — H16.223: ICD-10-CM

## 2025-03-25 DIAGNOSIS — H35.371: ICD-10-CM

## 2025-03-25 DIAGNOSIS — H43.813: ICD-10-CM

## 2025-03-25 DIAGNOSIS — H25.13: ICD-10-CM

## 2025-03-25 PROCEDURE — 92134 CPTRZ OPH DX IMG PST SGM RTA: CPT | Performed by: OPTOMETRIST

## 2025-03-25 PROCEDURE — 92014 COMPRE OPH EXAM EST PT 1/>: CPT | Performed by: OPTOMETRIST

## 2025-03-25 ASSESSMENT — REFRACTION_MANIFEST
OS_SPHERE: +1.25
OD_AXIS: 025
OS_VA1: 20/20
OD_SPHERE: +2.00
OD_CYLINDER: -0.50
OS_AXIS: 150
OD_VA1: 20/20-
OS_CYLINDER: -0.50

## 2025-03-25 ASSESSMENT — CONFRONTATIONAL VISUAL FIELD TEST (CVF)
OD_FINDINGS: FULL
OS_FINDINGS: FULL

## 2025-03-25 ASSESSMENT — SUPERFICIAL PUNCTATE KERATITIS (SPK)
OS_SPK: 1+
OD_SPK: 1+

## 2025-03-25 ASSESSMENT — VISUAL ACUITY
OS_BCVA: 20/30-2
OD_BCVA: 20/30-2

## 2025-03-25 ASSESSMENT — TONOMETRY
OS_IOP_MMHG: 11
OD_IOP_MMHG: 10

## 2025-04-24 NOTE — H&P PST ADULT - NS PRO LACT YNNA
Pt inf she said that her weight is 270 currently I told her to check her b/p weight and monitor [facility]me[facility] med list updated    no

## 2025-05-13 ENCOUNTER — OFFICE (OUTPATIENT)
Dept: URBAN - METROPOLITAN AREA CLINIC 104 | Facility: CLINIC | Age: 70
Setting detail: OPHTHALMOLOGY
End: 2025-05-13
Payer: MEDICARE

## 2025-05-13 DIAGNOSIS — H43.813: ICD-10-CM

## 2025-05-13 DIAGNOSIS — H16.223: ICD-10-CM

## 2025-05-13 DIAGNOSIS — H35.341: ICD-10-CM

## 2025-05-13 DIAGNOSIS — H35.371: ICD-10-CM

## 2025-05-13 DIAGNOSIS — H25.13: ICD-10-CM

## 2025-05-13 PROCEDURE — 92012 INTRM OPH EXAM EST PATIENT: CPT | Performed by: OPTOMETRIST

## 2025-05-13 ASSESSMENT — KERATOMETRY
OD_K1POWER_DIOPTERS: 43.05
OD_AXISANGLE_DEGREES: 103
OD_K2POWER_DIOPTERS: 43.49
OS_AXISANGLE_DEGREES: 071
OS_K2POWER_DIOPTERS: 44.00
OS_K1POWER_DIOPTERS: 43.27

## 2025-05-13 ASSESSMENT — CONFRONTATIONAL VISUAL FIELD TEST (CVF)
OD_FINDINGS: FULL
OS_FINDINGS: FULL

## 2025-05-13 ASSESSMENT — REFRACTION_MANIFEST
OS_VA1: 20/20
OD_AXIS: 025
OS_CYLINDER: -0.50
OD_SPHERE: +2.00
OD_VA1: 20/20-
OS_SPHERE: +1.25
OD_CYLINDER: -0.50
OS_AXIS: 150

## 2025-05-13 ASSESSMENT — VISUAL ACUITY
OD_BCVA: 20/30-1
OS_BCVA: 20/30-2

## 2025-05-13 ASSESSMENT — SUPERFICIAL PUNCTATE KERATITIS (SPK)
OS_SPK: 1+
OD_SPK: 1+

## 2025-05-13 ASSESSMENT — REFRACTION_AUTOREFRACTION
OS_SPHERE: +1.50
OD_SPHERE: +1.75
OD_CYLINDER: -1.00
OS_CYLINDER: -0.75
OD_AXIS: 026
OS_AXIS: 171

## 2025-06-06 ENCOUNTER — OFFICE (OUTPATIENT)
Dept: URBAN - METROPOLITAN AREA CLINIC 104 | Facility: CLINIC | Age: 70
Setting detail: OPHTHALMOLOGY
End: 2025-06-06
Payer: MEDICARE

## 2025-06-06 DIAGNOSIS — H43.811: ICD-10-CM

## 2025-06-06 DIAGNOSIS — H35.40: ICD-10-CM

## 2025-06-06 DIAGNOSIS — H35.371: ICD-10-CM

## 2025-06-06 DIAGNOSIS — H35.341: ICD-10-CM

## 2025-06-06 PROCEDURE — 92014 COMPRE OPH EXAM EST PT 1/>: CPT | Performed by: OPHTHALMOLOGY

## 2025-06-06 PROCEDURE — 92134 CPTRZ OPH DX IMG PST SGM RTA: CPT | Performed by: OPHTHALMOLOGY

## 2025-06-06 ASSESSMENT — VISUAL ACUITY
OD_BCVA: 20/30
OS_BCVA: 20/30